# Patient Record
Sex: FEMALE | Race: WHITE | NOT HISPANIC OR LATINO | URBAN - METROPOLITAN AREA
[De-identification: names, ages, dates, MRNs, and addresses within clinical notes are randomized per-mention and may not be internally consistent; named-entity substitution may affect disease eponyms.]

---

## 2018-09-30 VITALS
TEMPERATURE: 100 F | HEART RATE: 75 BPM | OXYGEN SATURATION: 96 % | SYSTOLIC BLOOD PRESSURE: 115 MMHG | RESPIRATION RATE: 18 BRPM | HEIGHT: 61 IN | DIASTOLIC BLOOD PRESSURE: 75 MMHG | WEIGHT: 171.08 LBS

## 2018-09-30 LAB
ALBUMIN SERPL ELPH-MCNC: 4.3 G/DL — SIGNIFICANT CHANGE UP (ref 3.3–5)
ALP SERPL-CCNC: 77 U/L — SIGNIFICANT CHANGE UP (ref 40–120)
ALT FLD-CCNC: 30 U/L — SIGNIFICANT CHANGE UP (ref 10–45)
ANION GAP SERPL CALC-SCNC: 11 MMOL/L — SIGNIFICANT CHANGE UP (ref 5–17)
AST SERPL-CCNC: 26 U/L — SIGNIFICANT CHANGE UP (ref 10–40)
BASOPHILS NFR BLD AUTO: 0.8 % — SIGNIFICANT CHANGE UP (ref 0–2)
BILIRUB SERPL-MCNC: 0.5 MG/DL — SIGNIFICANT CHANGE UP (ref 0.2–1.2)
BUN SERPL-MCNC: 13 MG/DL — SIGNIFICANT CHANGE UP (ref 7–23)
CALCIUM SERPL-MCNC: 9.3 MG/DL — SIGNIFICANT CHANGE UP (ref 8.4–10.5)
CHLORIDE SERPL-SCNC: 98 MMOL/L — SIGNIFICANT CHANGE UP (ref 96–108)
CO2 SERPL-SCNC: 27 MMOL/L — SIGNIFICANT CHANGE UP (ref 22–31)
CREAT SERPL-MCNC: 0.63 MG/DL — SIGNIFICANT CHANGE UP (ref 0.5–1.3)
EOSINOPHIL NFR BLD AUTO: 11 % — HIGH (ref 0–6)
GLUCOSE SERPL-MCNC: 91 MG/DL — SIGNIFICANT CHANGE UP (ref 70–99)
HCT VFR BLD CALC: 43.6 % — SIGNIFICANT CHANGE UP (ref 34.5–45)
HGB BLD-MCNC: 14.2 G/DL — SIGNIFICANT CHANGE UP (ref 11.5–15.5)
LYMPHOCYTES # BLD AUTO: 27.4 % — SIGNIFICANT CHANGE UP (ref 13–44)
MCHC RBC-ENTMCNC: 32.6 G/DL — SIGNIFICANT CHANGE UP (ref 32–36)
MCHC RBC-ENTMCNC: 33.8 PG — SIGNIFICANT CHANGE UP (ref 27–34)
MCV RBC AUTO: 103.8 FL — HIGH (ref 80–100)
MONOCYTES NFR BLD AUTO: 9.2 % — SIGNIFICANT CHANGE UP (ref 2–14)
NEUTROPHILS NFR BLD AUTO: 51.6 % — SIGNIFICANT CHANGE UP (ref 43–77)
PLATELET # BLD AUTO: 229 K/UL — SIGNIFICANT CHANGE UP (ref 150–400)
POTASSIUM SERPL-MCNC: 4.4 MMOL/L — SIGNIFICANT CHANGE UP (ref 3.5–5.3)
POTASSIUM SERPL-SCNC: 4.4 MMOL/L — SIGNIFICANT CHANGE UP (ref 3.5–5.3)
PROT SERPL-MCNC: 7.2 G/DL — SIGNIFICANT CHANGE UP (ref 6–8.3)
RBC # BLD: 4.2 M/UL — SIGNIFICANT CHANGE UP (ref 3.8–5.2)
RBC # FLD: 13 % — SIGNIFICANT CHANGE UP (ref 10.3–16.9)
SODIUM SERPL-SCNC: 136 MMOL/L — SIGNIFICANT CHANGE UP (ref 135–145)
TROPONIN T SERPL-MCNC: <0.01 NG/ML — SIGNIFICANT CHANGE UP (ref 0–0.01)
WBC # BLD: 7.3 K/UL — SIGNIFICANT CHANGE UP (ref 3.8–10.5)
WBC # FLD AUTO: 7.3 K/UL — SIGNIFICANT CHANGE UP (ref 3.8–10.5)

## 2018-09-30 PROCEDURE — 70486 CT MAXILLOFACIAL W/O DYE: CPT | Mod: 26

## 2018-09-30 PROCEDURE — 71275 CT ANGIOGRAPHY CHEST: CPT | Mod: 26

## 2018-09-30 PROCEDURE — 99285 EMERGENCY DEPT VISIT HI MDM: CPT

## 2018-09-30 PROCEDURE — 71045 X-RAY EXAM CHEST 1 VIEW: CPT | Mod: 26

## 2018-09-30 PROCEDURE — 70450 CT HEAD/BRAIN W/O DYE: CPT | Mod: 26

## 2018-09-30 RX ORDER — SODIUM CHLORIDE 9 MG/ML
1000 INJECTION INTRAMUSCULAR; INTRAVENOUS; SUBCUTANEOUS ONCE
Qty: 0 | Refills: 0 | Status: COMPLETED | OUTPATIENT
Start: 2018-09-30 | End: 2018-09-30

## 2018-09-30 RX ORDER — TETANUS TOXOID, REDUCED DIPHTHERIA TOXOID AND ACELLULAR PERTUSSIS VACCINE, ADSORBED 5; 2.5; 8; 8; 2.5 [IU]/.5ML; [IU]/.5ML; UG/.5ML; UG/.5ML; UG/.5ML
0.5 SUSPENSION INTRAMUSCULAR ONCE
Qty: 0 | Refills: 0 | Status: COMPLETED | OUTPATIENT
Start: 2018-09-30 | End: 2018-09-30

## 2018-09-30 RX ORDER — IPRATROPIUM/ALBUTEROL SULFATE 18-103MCG
3 AEROSOL WITH ADAPTER (GRAM) INHALATION
Qty: 0 | Refills: 0 | Status: COMPLETED | OUTPATIENT
Start: 2018-09-30 | End: 2018-09-30

## 2018-09-30 RX ADMIN — Medication 3 MILLILITER(S): at 19:06

## 2018-09-30 RX ADMIN — SODIUM CHLORIDE 1000 MILLILITER(S): 9 INJECTION INTRAMUSCULAR; INTRAVENOUS; SUBCUTANEOUS at 21:18

## 2018-09-30 RX ADMIN — SODIUM CHLORIDE 2000 MILLILITER(S): 9 INJECTION INTRAMUSCULAR; INTRAVENOUS; SUBCUTANEOUS at 18:14

## 2018-09-30 RX ADMIN — Medication 125 MILLIGRAM(S): at 19:01

## 2018-09-30 RX ADMIN — TETANUS TOXOID, REDUCED DIPHTHERIA TOXOID AND ACELLULAR PERTUSSIS VACCINE, ADSORBED 0.5 MILLILITER(S): 5; 2.5; 8; 8; 2.5 SUSPENSION INTRAMUSCULAR at 19:20

## 2018-09-30 RX ADMIN — Medication 3 MILLILITER(S): at 19:02

## 2018-09-30 RX ADMIN — Medication 3 MILLILITER(S): at 19:03

## 2018-09-30 NOTE — ED PROVIDER NOTE - OBJECTIVE STATEMENT
69 y/o F former smoker for 20+ yrs w/hx HTN, HLD, hypothyroidism, anxiety on paroxetine and PRN xanax, COPD (recently started on meds 2mo ago), no known cardiac disease, p/w 4 syncopal episode/drop attacks in the past 3-4wks, no prior syncope, most recently today in which pt was walking down the street and collapsed, losing consciousness for several seconds, falling onto her face w/obvious facial trauma. Pt states she has facial pain 2/2 fall, though notes no prodromal sx. Of note has been coughing frequently recently, sometimes productive with white sputum, though states she is not SOB at baseline or at time of interview. No fever/chills. No abd pain or n/v. All other syncopal episodes have been similar in nature, occurring without warning. She syncopized at the dinner table last week, and 2 other times before that, once at [facility] awaiting a CT of her sinuses and chest (to f/u on known pulmonary nodules and frequent sinus infections). Did not go to the ED or get a workup at [facility] when she collapsed. At time of interview pt is only endorsing mild facial pain. No blurry vision, HA, neck pain, CP, SOB, palpitations, LE pain/swelling or other MSK pain.

## 2018-09-30 NOTE — ED ADULT TRIAGE NOTE - CHIEF COMPLAINT QUOTE
EMS states "she had an episode of syncope at a restaurant. She also passed out last week and went to [facility]."

## 2018-09-30 NOTE — ED ADULT NURSE NOTE - NSIMPLEMENTINTERV_GEN_ALL_ED
Implemented All Fall Risk Interventions:  [place] to call system. Call bell, personal items and telephone within reach. Instruct patient to call for assistance. Room bathroom lighting operational. Non-slip footwear when patient is off stretcher. Physically safe environment: no spills, clutter or unnecessary equipment. Stretcher in lowest position, wheels locked, appropriate side rails in place. Provide visual cue, wrist band, yellow gown, etc. Monitor gait and stability. Monitor for mental status changes and reorient to person, place, and time. Review medications for side effects contributing to fall risk. Reinforce activity limits and safety measures with patient and family.

## 2018-09-30 NOTE — ED ADULT NURSE NOTE - CHPI ED NUR SYMPTOMS NEG
no tingling/no nausea/no dizziness/no vomiting/no weakness/no chills/no decreased eating/drinking/no fever

## 2018-09-30 NOTE — ED ADULT NURSE NOTE - OBJECTIVE STATEMENT
68 yr old female patient with c/o of syncope episode.  PAtient states she has been seeing her PMD & ENT for syncopal episodes x2weeks (followed at [facility]).  Patient states the last she remembered she was walking around to [employer] to get a birthday card and then she woke up to EMS.  PAtient A+OX3, awake, no distress noted.  Abrasions noted to facial area, with blood shot R eye.  Denies dizziness at this time.  Denies being seen by neurologist.  Denies chest pain, sob/elmer, n/v/d/f.

## 2018-09-30 NOTE — ED ADULT NURSE NOTE - CHIEF COMPLAINT QUOTE
EMS states "she had an episode of syncope at a restaurant. She also passed out last week and went to [facility] [place]."

## 2018-09-30 NOTE — ED PROVIDER NOTE - PHYSICAL EXAMINATION
VITAL SIGNS: I have reviewed nursing notes and confirm.  CONSTITUTIONAL: Well-developed; well-nourished female w/obvious facial trauma though otherwise comfortable and conversational; in no acute distress.  SKIN: Agree with RN documentation regarding decubitus evaluation. Remainder of skin exam is warm and dry, no acute rash.  HEAD: Normocephalic; + scattered facial abrasions with 3x3cm hematoma L forehead, no fernanda abnormality appreciated  EYES: PERRL, EOM intact; + L eye subconjunctival hemorrhage w/mild hemorraghic chemosis, no hyphema, no proptosis  ENT: No nasal discharge; airway clear, no septal hematoma, no hemotympanum  NECK: Supple; non tender.  CARD: S1, S2 normal; no murmurs, gallops, or rubs. RRR  RESP: + b/l exp wheeze all lung fields w/good excursion, no inc wob/accessory muscle recruitment or tachypnea.  ABD: Normal bowel sounds; soft; non-distended; non-tender  EXT: Normal ROM. No clubbing, cyanosis or edema.  LYMPH: No acute cervical adenopathy.  NEURO: Alert, oriented. Grossly unremarkable.  PSYCH: Cooperative, appropriate.

## 2018-10-01 ENCOUNTER — INPATIENT (INPATIENT)
Facility: HOSPITAL | Age: 68
LOS: 2 days | Discharge: HOME | DRG: 312 | End: 2018-10-04
Attending: INTERNAL MEDICINE | Admitting: INTERNAL MEDICINE
Payer: MEDICARE

## 2018-10-01 DIAGNOSIS — E03.9 HYPOTHYROIDISM, UNSPECIFIED: ICD-10-CM

## 2018-10-01 DIAGNOSIS — J44.9 CHRONIC OBSTRUCTIVE PULMONARY DISEASE, UNSPECIFIED: ICD-10-CM

## 2018-10-01 DIAGNOSIS — Z96.659 PRESENCE OF UNSPECIFIED ARTIFICIAL KNEE JOINT: Chronic | ICD-10-CM

## 2018-10-01 DIAGNOSIS — I10 ESSENTIAL (PRIMARY) HYPERTENSION: ICD-10-CM

## 2018-10-01 DIAGNOSIS — R55 SYNCOPE AND COLLAPSE: ICD-10-CM

## 2018-10-01 DIAGNOSIS — E78.00 PURE HYPERCHOLESTEROLEMIA, UNSPECIFIED: ICD-10-CM

## 2018-10-01 LAB
ANION GAP SERPL CALC-SCNC: 16 MMOL/L — SIGNIFICANT CHANGE UP (ref 5–17)
BUN SERPL-MCNC: 13 MG/DL — SIGNIFICANT CHANGE UP (ref 7–23)
CALCIUM SERPL-MCNC: 9.6 MG/DL — SIGNIFICANT CHANGE UP (ref 8.4–10.5)
CHLORIDE SERPL-SCNC: 100 MMOL/L — SIGNIFICANT CHANGE UP (ref 96–108)
CHOLEST SERPL-MCNC: 191 MG/DL — SIGNIFICANT CHANGE UP (ref 10–199)
CO2 SERPL-SCNC: 23 MMOL/L — SIGNIFICANT CHANGE UP (ref 22–31)
CREAT SERPL-MCNC: 0.57 MG/DL — SIGNIFICANT CHANGE UP (ref 0.5–1.3)
GLUCOSE SERPL-MCNC: 170 MG/DL — HIGH (ref 70–99)
HBA1C BLD-MCNC: 5.8 % — HIGH (ref 4–5.6)
HCT VFR BLD CALC: 38.8 % — SIGNIFICANT CHANGE UP (ref 34.5–45)
HDLC SERPL-MCNC: 82 MG/DL — SIGNIFICANT CHANGE UP
HGB BLD-MCNC: 13.2 G/DL — SIGNIFICANT CHANGE UP (ref 11.5–15.5)
LIPID PNL WITH DIRECT LDL SERPL: 101 MG/DL — SIGNIFICANT CHANGE UP
MAGNESIUM SERPL-MCNC: 2.5 MG/DL — SIGNIFICANT CHANGE UP (ref 1.6–2.6)
MCHC RBC-ENTMCNC: 34 G/DL — SIGNIFICANT CHANGE UP (ref 32–36)
MCHC RBC-ENTMCNC: 34.6 PG — HIGH (ref 27–34)
MCV RBC AUTO: 101.8 FL — HIGH (ref 80–100)
PLATELET # BLD AUTO: 213 K/UL — SIGNIFICANT CHANGE UP (ref 150–400)
POTASSIUM SERPL-MCNC: 4.5 MMOL/L — SIGNIFICANT CHANGE UP (ref 3.5–5.3)
POTASSIUM SERPL-SCNC: 4.5 MMOL/L — SIGNIFICANT CHANGE UP (ref 3.5–5.3)
RAPID RVP RESULT: SIGNIFICANT CHANGE UP
RBC # BLD: 3.81 M/UL — SIGNIFICANT CHANGE UP (ref 3.8–5.2)
RBC # FLD: 13.1 % — SIGNIFICANT CHANGE UP (ref 10.3–16.9)
SODIUM SERPL-SCNC: 139 MMOL/L — SIGNIFICANT CHANGE UP (ref 135–145)
T3FREE SERPL-MCNC: 1.65 PG/ML — LOW (ref 1.71–3.71)
T4 FREE SERPL-MCNC: 1 NG/DL — SIGNIFICANT CHANGE UP (ref 0.7–1.48)
TOTAL CHOLESTEROL/HDL RATIO MEASUREMENT: 2.3 RATIO — LOW (ref 3.3–7.1)
TRIGL SERPL-MCNC: 41 MG/DL — SIGNIFICANT CHANGE UP (ref 10–149)
TROPONIN T SERPL-MCNC: <0.01 NG/ML — SIGNIFICANT CHANGE UP (ref 0–0.01)
TSH SERPL-MCNC: 0.57 UIU/ML — SIGNIFICANT CHANGE UP (ref 0.35–4.94)
WBC # BLD: 8.8 K/UL — SIGNIFICANT CHANGE UP (ref 3.8–10.5)
WBC # FLD AUTO: 8.8 K/UL — SIGNIFICANT CHANGE UP (ref 3.8–10.5)

## 2018-10-01 PROCEDURE — 93306 TTE W/DOPPLER COMPLETE: CPT | Mod: 26

## 2018-10-01 RX ORDER — IPRATROPIUM/ALBUTEROL SULFATE 18-103MCG
3 AEROSOL WITH ADAPTER (GRAM) INHALATION EVERY 6 HOURS
Qty: 0 | Refills: 0 | Status: DISCONTINUED | OUTPATIENT
Start: 2018-10-01 | End: 2018-10-04

## 2018-10-01 RX ORDER — ATORVASTATIN CALCIUM 80 MG/1
10 TABLET, FILM COATED ORAL AT BEDTIME
Qty: 0 | Refills: 0 | Status: DISCONTINUED | OUTPATIENT
Start: 2018-10-01 | End: 2018-10-04

## 2018-10-01 RX ORDER — ALBUTEROL 90 UG/1
2 AEROSOL, METERED ORAL
Qty: 0 | Refills: 0 | COMMUNITY

## 2018-10-01 RX ORDER — ALPRAZOLAM 0.25 MG
1 TABLET ORAL THREE TIMES A DAY
Qty: 0 | Refills: 0 | Status: DISCONTINUED | OUTPATIENT
Start: 2018-10-01 | End: 2018-10-04

## 2018-10-01 RX ORDER — LEVOTHYROXINE SODIUM 125 MCG
150 TABLET ORAL DAILY
Qty: 0 | Refills: 0 | Status: DISCONTINUED | OUTPATIENT
Start: 2018-10-01 | End: 2018-10-04

## 2018-10-01 RX ORDER — ATORVASTATIN CALCIUM 80 MG/1
1 TABLET, FILM COATED ORAL
Qty: 0 | Refills: 0 | COMMUNITY

## 2018-10-01 RX ORDER — AMLODIPINE BESYLATE 2.5 MG/1
10 TABLET ORAL DAILY
Qty: 0 | Refills: 0 | Status: DISCONTINUED | OUTPATIENT
Start: 2018-10-01 | End: 2018-10-04

## 2018-10-01 RX ORDER — HEPARIN SODIUM 5000 [USP'U]/ML
5000 INJECTION INTRAVENOUS; SUBCUTANEOUS EVERY 8 HOURS
Qty: 0 | Refills: 0 | Status: DISCONTINUED | OUTPATIENT
Start: 2018-10-01 | End: 2018-10-04

## 2018-10-01 RX ORDER — LANOLIN ALCOHOL/MO/W.PET/CERES
5 CREAM (GRAM) TOPICAL AT BEDTIME
Qty: 0 | Refills: 0 | Status: DISCONTINUED | OUTPATIENT
Start: 2018-10-01 | End: 2018-10-04

## 2018-10-01 RX ORDER — PANTOPRAZOLE SODIUM 20 MG/1
40 TABLET, DELAYED RELEASE ORAL
Qty: 0 | Refills: 0 | Status: DISCONTINUED | OUTPATIENT
Start: 2018-10-01 | End: 2018-10-04

## 2018-10-01 RX ORDER — INFLUENZA VIRUS VACCINE 15; 15; 15; 15 UG/.5ML; UG/.5ML; UG/.5ML; UG/.5ML
0.5 SUSPENSION INTRAMUSCULAR ONCE
Qty: 0 | Refills: 0 | Status: DISCONTINUED | OUTPATIENT
Start: 2018-10-01 | End: 2018-10-04

## 2018-10-01 RX ADMIN — Medication 30 MILLIGRAM(S): at 14:49

## 2018-10-01 RX ADMIN — Medication 1 MILLIGRAM(S): at 21:42

## 2018-10-01 RX ADMIN — Medication 3 MILLILITER(S): at 05:43

## 2018-10-01 RX ADMIN — HEPARIN SODIUM 5000 UNIT(S): 5000 INJECTION INTRAVENOUS; SUBCUTANEOUS at 21:42

## 2018-10-01 RX ADMIN — ATORVASTATIN CALCIUM 10 MILLIGRAM(S): 80 TABLET, FILM COATED ORAL at 21:42

## 2018-10-01 RX ADMIN — Medication 3 MILLILITER(S): at 18:52

## 2018-10-01 RX ADMIN — HEPARIN SODIUM 5000 UNIT(S): 5000 INJECTION INTRAVENOUS; SUBCUTANEOUS at 05:43

## 2018-10-01 RX ADMIN — Medication 5 MILLIGRAM(S): at 03:00

## 2018-10-01 RX ADMIN — Medication 150 MICROGRAM(S): at 14:48

## 2018-10-01 RX ADMIN — AMLODIPINE BESYLATE 10 MILLIGRAM(S): 2.5 TABLET ORAL at 14:48

## 2018-10-01 RX ADMIN — HEPARIN SODIUM 5000 UNIT(S): 5000 INJECTION INTRAVENOUS; SUBCUTANEOUS at 14:48

## 2018-10-01 RX ADMIN — Medication 3 MILLILITER(S): at 13:02

## 2018-10-01 NOTE — H&P ADULT - PROBLEM SELECTOR PLAN 2
Patient in exacerbation on admission and given solumedrol 125mg IV x 1 and nebs in ED  - discuss continuation of steroids with Dr. Wiggins  - consider pulm consult  - CTA ruled out PE and consolidation

## 2018-10-01 NOTE — PROGRESS NOTE ADULT - SUBJECTIVE AND OBJECTIVE BOX
Interventional Cardiology PA Adult Progress Note    CC: syncope  Subjective Assessment: Pt seen and examined at bedside. Pt currently hungry   	  MEDICATIONS:  amLODIPine   Tablet 10 milliGRAM(s) Oral daily      ALBUTerol/ipratropium for Nebulization 3 milliLiter(s) Nebulizer every 6 hours    ALPRAZolam 1 milliGRAM(s) Oral three times a day  melatonin 5 milliGRAM(s) Oral at bedtime  PARoxetine 30 milliGRAM(s) Oral daily    pantoprazole    Tablet 40 milliGRAM(s) Oral before breakfast    atorvastatin 10 milliGRAM(s) Oral at bedtime  levothyroxine 150 MICROGram(s) Oral daily    heparin  Injectable 5000 Unit(s) SubCutaneous every 8 hours  influenza   Vaccine 0.5 milliLiter(s) IntraMuscular once      	    [PHYSICAL EXAM:  TELEMETRY:  T(C): 36.8 (10-01-18 @ 14:45), Max: 37.7 (10-01-18 @ 06:22)  HR: 87 (10-01-18 @ 14:55) (77 - 87)  BP: 134/64 (10-01-18 @ 14:55) (112/65 - 134/64)  RR: 16 (10-01-18 @ 14:55) (16 - 18)  SpO2: 96% (10-01-18 @ 14:55) (92% - 96%)  Wt(kg): --  I&O's Summary    01 Oct 2018 07:01  -  01 Oct 2018 17:24  --------------------------------------------------------  IN: 0 mL / OUT: 0 mL / NET: 0 mL      Height (cm): 154.94 (10-01 @ 06:36)  Weight (kg): 79.2 (10-01 @ 06:36)  BMI (kg/m2): 33 (10-01 @ 06:36)  BSA (m2): 1.78 (10-01 @ 06:36)  Clemente:  Central/PICC/Mid Line:                                         Appearance: Normal	  HEENT:   Normal oral mucosa, PERRL, EOMI	  Neck: Supple, + JVD/ - JVD; Carotid Bruit   Cardiovascular: Normal S1 S2, No JVD, No murmurs,   Respiratory: Lungs clear to auscultation/Decreased Breath Sounds/No Rales, Rhonchi, Wheezing	  Gastrointestinal:  Soft, Non-tender, + BS	  Skin: No rashes, No ecchymoses, No cyanosis  Extremities: Normal range of motion, No clubbing, cyanosis or edema  Vascular: Peripheral pulses palpable 2+ bilaterally  Neurologic: Non-focal  Psychiatry: A & O x 3, Mood & affect appropriate      	    ECG:  	  RADIOLOGY:   DIAGNOSTIC TESTING:  [ ] Echocardiogram:  [ ]  Catheterization:  [ ] Stress Test:    [ ] CARMEN  OTHER: 	    LABS:	 	  CARDIAC MARKERS:                                  13.2   8.8   )-----------( 213      ( 01 Oct 2018 07:05 )             38.8     10-01    139  |  100  |  13  ----------------------------<  170<H>  4.5   |  23  |  0.57    Ca    9.6      01 Oct 2018 07:05  Mg     2.5     10-01    TPro  7.2  /  Alb  4.3  /  TBili  0.5  /  DBili  x   /  AST  26  /  ALT  30  /  AlkPhos  77  09-30    proBNP:   Lipid Profile:   HgA1c: Hemoglobin A1C, Whole Blood: 5.8 % (10-01 @ 07:05)    TSH: Thyroid Stimulating Hormone, Serum: 0.572 uIU/mL (10-01 @ 07:05)        ASSESSMENT/PLAN: 	        DVT ppx:  Dispo: Interventional Cardiology PA Adult Progress Note    CC: syncope  Subjective Assessment: Pt seen and examined at bedside. Pt currently hungry. Denies dizziness or chest pain.    12 point review of systems otherwise negative except for subjective HPI.   	  MEDICATIONS:  amLODIPine   Tablet 10 milliGRAM(s) Oral daily      ALBUTerol/ipratropium for Nebulization 3 milliLiter(s) Nebulizer every 6 hours    ALPRAZolam 1 milliGRAM(s) Oral three times a day  melatonin 5 milliGRAM(s) Oral at bedtime  PARoxetine 30 milliGRAM(s) Oral daily    pantoprazole    Tablet 40 milliGRAM(s) Oral before breakfast    atorvastatin 10 milliGRAM(s) Oral at bedtime  levothyroxine 150 MICROGram(s) Oral daily    heparin  Injectable 5000 Unit(s) SubCutaneous every 8 hours  influenza   Vaccine 0.5 milliLiter(s) IntraMuscular once      	    [PHYSICAL EXAM:  TELEMETRY:  T(C): 36.8 (10-01-18 @ 14:45), Max: 37.7 (10-01-18 @ 06:22)  HR: 87 (10-01-18 @ 14:55) (77 - 87)  BP: 134/64 (10-01-18 @ 14:55) (112/65 - 134/64)  RR: 16 (10-01-18 @ 14:55) (16 - 18)  SpO2: 96% (10-01-18 @ 14:55) (92% - 96%)  Wt(kg): --  I&O's Summary    01 Oct 2018 07:01  -  01 Oct 2018 17:24  --------------------------------------------------------  IN: 0 mL / OUT: 0 mL / NET: 0 mL      Height (cm): 154.94 (10-01 @ 06:36)  Weight (kg): 79.2 (10-01 @ 06:36)  BMI (kg/m2): 33 (10-01 @ 06:36)  BSA (m2): 1.78 (10-01 @ 06:36)  Clemente:  Central/PICC/Mid Line:                                         Appearance: bruising and lacerations on face, well groomed, well appearing 	  HEENT:   Normal oral mucosa, PERRL, EOMI, subconjunctival hemorrhage to R eye 	  Neck: Supple,  - JVD;   Cardiovascular: Normal S1 S2, No JVD, No murmurs,   Respiratory: No Rales, Rhonchi, B/L faint exp/insp Wheezing	  Gastrointestinal:  Soft, Non-tender, + BS	  Skin: No rashes, No ecchymoses, No cyanosis  Extremities: Normal range of motion, No clubbing, cyanosis or edema  Vascular: Peripheral pulses palpable 2+ bilaterally  Neurologic: Non-focal  Psychiatry: A & O x 3, Mood & affect appropriate      	    ECG:  	  RADIOLOGY:   DIAGNOSTIC TESTING:  [ ] Echocardiogram:  [ ]  Catheterization:  [ ] Stress Test:    [ ] CARMEN  OTHER: 	    LABS:	 	  CARDIAC MARKERS:                                  13.2   8.8   )-----------( 213      ( 01 Oct 2018 07:05 )             38.8     10-01    139  |  100  |  13  ----------------------------<  170<H>  4.5   |  23  |  0.57    Ca    9.6      01 Oct 2018 07:05  Mg     2.5     10-01    TPro  7.2  /  Alb  4.3  /  TBili  0.5  /  DBili  x   /  AST  26  /  ALT  30  /  AlkPhos  77  09-30    proBNP:   Lipid Profile:   HgA1c: Hemoglobin A1C, Whole Blood: 5.8 % (10-01 @ 07:05)    TSH: Thyroid Stimulating Hormone, Serum: 0.572 uIU/mL (10-01 @ 07:05)        ASSESSMENT/PLAN: 	        DVT ppx:  Dispo:

## 2018-10-01 NOTE — H&P ADULT - PROBLEM SELECTOR PLAN 4
Patient with hx of elevated cholesterol on statin at home  - follow up lipid panel in AM  - determine dose of atorva with pharmacy in AM and continue

## 2018-10-01 NOTE — H&P ADULT - PROBLEM SELECTOR PLAN 5
SBP 120s  - determine dose of home amlodipine in AM and continue    DVT PPX: Heparin SQ  DISPO: Pending further workup of syncope and monitoring on tele

## 2018-10-01 NOTE — PROGRESS NOTE ADULT - PROBLEM SELECTOR PLAN 2
Patient with wheezing on admission and given solumedrol 125mg IV x 1 and nebs in ED  - pulm consulted, Dr. Becker  - CTA ruled out PE and consolidation  -continue with duonebs PRN, RVP negative  -pending sputum sample  -continue with NC, humidified  -no abx or steroids at this time per Dr. Becker

## 2018-10-01 NOTE — H&P ADULT - PROBLEM SELECTOR PLAN 3
- follow up TSH, T3 and T4 with AM labs  - determine dose of home levothyroxine from pharmacy and continue in AM

## 2018-10-01 NOTE — PROGRESS NOTE ADULT - PROBLEM SELECTOR PLAN 1
continue to monitor on tele  - trops neg x 3  - ECHO 10/1: normal LV function, EF 60-65, Trace TR  - NPO for NST tomorrow AM, discuss with Dr. Wiggins in AM  -US carotids B/L ordered  -EEG ordered

## 2018-10-01 NOTE — H&P ADULT - PROBLEM SELECTOR PLAN 1
- monitor on tele  - trops neg x 2, follow up repeat in AM  - follow up echocardiogram  - follow up orthostatics  - follow up TSH, T3 and T4 with AM labs - monitor on tele  - trops neg x 2, follow up repeat in AM  - follow up echocardiogram  - follow up orthostatics  - NPO for possible NST, discuss with Dr. Wiggins in AM

## 2018-10-01 NOTE — H&P ADULT - NSHPSOCIALHISTORY_GEN_ALL_CORE
14.2   7.3   )-----------( 229      ( 30 Sep 2018 17:00 )             43.6       09-30    136  |  98  |  13  ----------------------------<  91  4.4   |  27  |  0.63    Ca    9.3      30 Sep 2018 17:00    TPro  7.2  /  Alb  4.3  /  TBili  0.5  /  DBili  x   /  AST  26  /  ALT  30  /  AlkPhos  77  09-30          CARDIAC MARKERS ( 30 Sep 2018 22:51 )  x     / <0.01 ng/mL / x     / x     / x      CARDIAC MARKERS ( 30 Sep 2018 17:00 )  x     / <0.01 ng/mL / 100 U/L / x     / 1.8 ng/mL

## 2018-10-01 NOTE — PROGRESS NOTE ADULT - SUBJECTIVE AND OBJECTIVE BOX
Interventional, Pulmonary, Critical, Chest Special Procedures.    Pt was seen and fully examined by myself.     Time spent with patient in minutes:77    Patient is a 68y old  Female who presents with a chief complaint of syncope (01 Oct 2018 01:57)Events leading to this admission reviewed. The patient reports intermittent and spasmodic severe cough which possibly triggers syncopal episodes. The cough is intermittent productive to clear sputum. The patient denies aspiration, denies hemoptysis, denies fever. She has had a cat for 15yrs. The patient saw multiple specialists including Pulmonary, ENT, Alergist at [facility]. The patient reports no improvementt and if anything worsening of her symptoms despite extensive testing. The patient is now anxious, dry cough is present. She is eupneic on 2LNC when seen and pain free.    HPI:  67yo F, former smoker (quit 1mo ago), with FHx MI (brother MI 62yo, father 43yo) and PMHx of HTN, HLD, hypothyroidism, anxiety and COPD who presented to [facility] ED on 10/1/18 complaining of cough and syncope x 4. Patient states first episode was when she was getting an outpatient CT scan at [facility] last Wed 9/26. She was walking along and suddenly woke up on the floor. She declined to be brought to the ED there at that time. Her next episode occurred while sitting on the couch when she suddenly awoke on the floor. Patient states she does not think she was out for more than a few seconds because the same comercial was still on TV. Yesterday she syncopized at the dinner table and her face went into her meal. Most recently today, she went for a walk and lost consciousness and awoke on the street. All incidents were preceded by lightheadedness and included head trauma. Patient has also been complaining of productive cough with white sputum x 3 weeks for which she has been followed by her PCP, pulm and ENT. She had CT scan of her sinuses that only revealed deviated septum and CT chest which revealed improvement in prior ?nodules. Patient states cough has been somewhat improving. Patient denies CP, palpitations, PND, orthopnea, N/V, hematochezia, melena, LE edema, tongue biting, evacuation of bladder/bowels, involuntary movement or seizure activity. She was scheduled for an outpatient stress test today and states she thinks she recently had an echocardiogram. Vitals on arrival to the ED were Temp 99.6F, HR 75bpm, /75, RR 18, O2 sat 96% RA. Labs significant for trops neg x 2. EKG revealed SR @ 70bpm with TWI in AVL, V2 and V3. CXR revealed small bandlike, patchy opacity across right lung base, likely representing atelectatic change. CT head revealed soft tissue injury of the left frontal scalp, no acute fracture, no intracranial hemorrhage. CTA ruled out PE and consolidation. In the ED, patient was given nebs, dTAP vaccine, solumedrol 125mg IV x 1, 1L NS. Patient is now admitted to [facility] for further workup of syncope. (01 Oct 2018 01:57)    REVIEW OF SYSTEMS:  Constitutional: No fever, weight loss, chills +++ fatigue  Eyes: No eye pain, visual disturbances, or discharge  ENMT:  No difficulty hearing, tinnitus, vertigo; No sinus or throat pain. No epistaxis, dysphagia,+ dysphonia, hoarseness no odynophagia  Neck: No pain, stiffness or neck swelling.  No masses or deformities  Respiratory: +++ cough, no wheezing, chills or hemoptysis  + COPD  - ILD   - PE   - ASTHMA     - PNEUMONIA  Cardiovascular: No chest pain, dysrhythmia, palpitations, dizziness or edema   +COPD     - CAD   - CHF   - HTN  Gastrointestinal: No abdominal or epigastric pain. No nausea, vomiting or hematemesis; No diarrhea or constipation. No melena or hematochezia. No dysphagia or Icterus.          Genitourinary: No dysuria, frequency, hematuria or incontinence   - CKD/CHAITANYA      - ESRD  Neurological: No headaches, memory loss, loss of strength, numbness or tremors      -DEMENTIA     - STROKE    - SEIZURE  Skin: No itching, burning, rashes or lesions   Lymph Nodes: No enlarged glands  Endocrine: No heat or cold intolerance; No hair loss       - DM     + THYROID DISORDER  Musculoskeletal: No joint pain or swelling; No muscle, back or extremity pain  Psychiatric: No depression, anxiety, mood swings or difficulty sleeping  Heme/Lymph: No easy bruising or bleeding gums         - ANEMIA      - CANCER   -COAGULOPATHY  Allergy and Immunologic: No hives or eczema    PAST MEDICAL & SURGICAL HISTORY:  Depression  Hypothyroidism  Anxiety  Goiter  High cholesterol  HTN (hypertension)  S/P total knee replacement    FAMILY HISTORY:  Family history of heart disease (Father)    SOCIAL HISTORY:      - Tobacco     - ETOH    Allergies    losartan (Angioedema)    Intolerances      Vital Signs Last 24 Hrs  T(C): 37.1 (01 Oct 2018 10:46), Max: 37.7 (01 Oct 2018 06:22)  T(F): 98.7 (01 Oct 2018 10:46), Max: 99.8 (01 Oct 2018 06:22)  HR: 80 (01 Oct 2018 08:42) (75 - 83)  BP: 124/60 (01 Oct 2018 08:42) (112/65 - 132/66)  BP(mean): 84 (01 Oct 2018 08:30) (83 - 84)  RR: 16 (01 Oct 2018 08:42) (16 - 18)  SpO2: 92% (01 Oct 2018 08:42) (92% - 96%)      PHYSICAL EXAM:  Un Comfortable, no immediate distress  Eyes: PERRL, EOM intact; conjunctiva injected, and sclera clear  Head: Normocephalic; + frontal ecchymoses Trauma  ENMT: No nasal discharge, +hoarseness, cough   Neck: Supple; non tender; no masses or deformities.    No JVD  Respiratory: - WHEEZING   + few scattered  RHONCHI  - RALES  - CRACKLES.  Diminished breath sounds  BILATERAL  RIGHT  LEFT  Cardiovascular: Regular rate and rhythm. S1 and S2 Normal; No murmurs, gallops or rubs     - PPM/AICD  Gastrointestinal: Soft non-tender, non-distended; Normal bowel sounds; No hepatosplenomegaly.     -PEG    -  GT   - STACY  Genitourinary: No costovertebral angle tenderness. No dysuria  Extremities: AROM, No clubbing, cyanosis or edema    Vascular: Peripheral pulses palpable 2+ bilaterally  Neurological: Alert and responisve to stimuli   Skin: Warm and dry. No obvious rash  Lymph Nodes: No acute cervical or supraclavicular adenopathy  Psychiatric: Cooperative and anxious  DEVICES:  - DENTURES   +IV R / L     - ETUBE   -TRACH   -CTUBE  R / L      LABS:                          13.2   8.8   )-----------( 213      ( 01 Oct 2018 07:05 )             38.8     10-01    139  |  100  |  13  ----------------------------<  170<H>  4.5   |  23  |  0.57    Ca    9.6      01 Oct 2018 07:05  Mg     2.5     10-01    TPro  7.2  /  Alb  4.3  /  TBili  0.5  /  DBili  x   /  AST  26  /  ALT  30  /  AlkPhos  77  09-30      < from: CT Angio Chest PE Protocol w/ IV Cont (09.30.18 @ 22:48) >    EXAM:  CT ANGIO CHEST PE PROTOCOL IC                          PROCEDURE DATE:  09/30/2018          INTERPRETATION:  CTA (CT angiography) of the CHEST dated 9/30/2018 10:48   PM    INDICATION: Syncope/collapse. Assess for pulmonary embolism.    TECHNIQUE: CT angiography of the chest was performed during bolus   injection of intravenous contrast.  Post-processing including the   production of axial, coronal and sagittal multiplanar reformatted images   and axial and coronal maximum intensity projections (MIPs) was performed.   93 mL of Optiray 350 administered with 5 mL discarded.    PRIOR STUDY: None.    FINDINGS: No pulmonary embolism is seen. There is mild calcified plaque   in the aorta. No aortic dissection flap is seen. Moderate coronary artery   calcification is noted. The heart is normal in size. No pericardial   effusion is seen. No hilar or axillary lymphadenopathy is seen. There is   a nonspecific, mildly increased number of mediastinal lymph nodes without   abnormal enlargement of the nodes.    No pleural effusions are seen. Mild atelectatic changes are noted in the   bibasilar lungs. There is a 4 mm perifissural nodular opacity in the left   major fissure, image 136 series 6, most likely an intrapulmonary node.   Areas ofmucoid impaction in both lower lobes suggesting small airways   disease. Micronodule in the right upper lobe.    Limited evaluation of the upper abdomen demonstrates colonic   diverticulosis.     Evaluation of the osseous structures demonstrates moderate degenerative   changes of the spine. A hemangioma is noted in the vertebral body T10.      IMPRESSION:     1. No pulmonary embolism is seen. No consolidation.    < end of copied text >  RADIOLOGY & ADDITIONAL STUDIES (The following images were personally reviewed):

## 2018-10-01 NOTE — H&P ADULT - RS GEN PE MLT RESP DETAILS PC
no rhonchi/respirations non-labored/no rales/no intercostal retractions/diminished breath sounds, R/diminished breath sounds, L

## 2018-10-01 NOTE — H&P ADULT - ASSESSMENT
69yo F with FHx MI (brother MI 64yo, father 43yo) and PMHx of HTN, HLD, hypothyroidism, anxiety and COPD who presented to [facility] ED on 10/1/18 complaining of cough and syncope x 4. 69yo F, former smoker (quit 1mo ago), with FHx MI (brother MI 64yo, father 41yo) and PMHx of HTN, HLD, hypothyroidism, anxiety and COPD who presented to [facility] ED on 10/1/18 complaining of cough and syncope x 4.

## 2018-10-01 NOTE — H&P ADULT - HISTORY OF PRESENT ILLNESS
67yo F with FHx CAD and PMHx of HTN, HLD, hypothyroidism, anxiety and COPD who presented to [facility] ED on 10/1/18 complaining of syncopal episodes. Patient states that she was walking along the street today when she suddenly lost consciousness without prodrome. Patient states this has happened 4 times in the last 3 weeks. Yesterday, she was eating dinner and suddenly lost consciousness, awaking with her face in her plate of food. Vitals on arrival to the ED were Temp 99.6F, HR 75bpm, /75, RR 18, O2 sat 96% RA. Labs significant for trops neg x 2. EKG revealed SR @ 70bpm with TWI in AVL, V2 and V3. CXR revealed small bandlike, patchy opacity across right lung base, likely representing atelectatic change. CT head revealed soft tissue injury of the left frontal scalp, no acute fracture, no intracranial hemorrhage. CTA ruled out PE and consolidation. In the ED, patient was given nebs, dTAP vaccine, solumedrol 125mg IV x 1, 1L NS. Patient is now admitted to [facility] for further workup of syncope. 69yo F with FHx MI (brother MI 64yo, father 43yo) and PMHx of HTN, HLD, hypothyroidism, anxiety and COPD who presented to [facility] ED on 10/1/18 complaining of cough and syncope x 4. Patient states first episode was when she was getting an outpatient CT scan at [facility] last Wed 9/26. She was walking along and suddenly woke up on the floor. She declined to be brought to the ED there at that time. Her next episode occurred while sitting on the couch when she suddenly awoke on the floor. Patient states she does not think she was out for more than a few seconds because the same comercial was still on TV. Yesterday she syncopized at the dinner table and her face went into her meal. Most recently today, she went for a walk and lost consciousness and awoke on the street. All incidents were preceded by lightheadedness and included head trauma. Patient has also been complaining of productive cough with white sputum x 3 weeks for which she has been followed by her PCP, pulm and ENT. She had CT scan of her sinuses that only revealed deviated septum and CT chest which revealed improvement in prior ?nodules. Patient states cough has been somewhat improving. Patient denies CP, palpitations, PND, orthopnea, N/V, hematochezia, melena, LE edema, tongue biting, evacuation of bladder/bowels, involuntary movement or seizure activity. She was scheduled for an outpatient stress test today and states she thinks she recently had an echocardiogram. Vitals on arrival to the ED were Temp 99.6F, HR 75bpm, /75, RR 18, O2 sat 96% RA. Labs significant for trops neg x 2. EKG revealed SR @ 70bpm with TWI in AVL, V2 and V3. CXR revealed small bandlike, patchy opacity across right lung base, likely representing atelectatic change. CT head revealed soft tissue injury of the left frontal scalp, no acute fracture, no intracranial hemorrhage. CTA ruled out PE and consolidation. In the ED, patient was given nebs, dTAP vaccine, solumedrol 125mg IV x 1, 1L NS. Patient is now admitted to [facility] for further workup of syncope. 69yo F, former smoker (quit 1mo ago), with FHx MI (brother MI 64yo, father 41yo) and PMHx of HTN, HLD, hypothyroidism, anxiety and COPD who presented to [facility] ED on 10/1/18 complaining of cough and syncope x 4. Patient states first episode was when she was getting an outpatient CT scan at [facility] last Wed 9/26. She was walking along and suddenly woke up on the floor. She declined to be brought to the ED there at that time. Her next episode occurred while sitting on the couch when she suddenly awoke on the floor. Patient states she does not think she was out for more than a few seconds because the same comercial was still on TV. Yesterday she syncopized at the dinner table and her face went into her meal. Most recently today, she went for a walk and lost consciousness and awoke on the street. All incidents were preceded by lightheadedness and included head trauma. Patient has also been complaining of productive cough with white sputum x 3 weeks for which she has been followed by her PCP, pulm and ENT. She had CT scan of her sinuses that only revealed deviated septum and CT chest which revealed improvement in prior ?nodules. Patient states cough has been somewhat improving. Patient denies CP, palpitations, PND, orthopnea, N/V, hematochezia, melena, LE edema, tongue biting, evacuation of bladder/bowels, involuntary movement or seizure activity. She was scheduled for an outpatient stress test today and states she thinks she recently had an echocardiogram. Vitals on arrival to the ED were Temp 99.6F, HR 75bpm, /75, RR 18, O2 sat 96% RA. Labs significant for trops neg x 2. EKG revealed SR @ 70bpm with TWI in AVL, V2 and V3. CXR revealed small bandlike, patchy opacity across right lung base, likely representing atelectatic change. CT head revealed soft tissue injury of the left frontal scalp, no acute fracture, no intracranial hemorrhage. CTA ruled out PE and consolidation. In the ED, patient was given nebs, dTAP vaccine, solumedrol 125mg IV x 1, 1L NS. Patient is now admitted to [facility] for further workup of syncope.

## 2018-10-01 NOTE — PROGRESS NOTE ADULT - SUBJECTIVE AND OBJECTIVE BOX
Pt   is s/p COPD   ETOH   use up until  one month ago   Since that time Pt has had shaking and twitches and episodes of syncope  on four occasions    PAST MEDICAL & SURGICAL HISTORY:  Depression  Hypothyroidism  Anxiety  Goiter  High cholesterol  HTN (hypertension)  S/P total knee replacement    MEDICATIONS  (STANDING):  ALBUTerol/ipratropium for Nebulization 3 milliLiter(s) Nebulizer every 6 hours  ALPRAZolam 1 milliGRAM(s) Oral three times a day  amLODIPine   Tablet 10 milliGRAM(s) Oral daily  atorvastatin 10 milliGRAM(s) Oral at bedtime  heparin  Injectable 5000 Unit(s) SubCutaneous every 8 hours  influenza   Vaccine 0.5 milliLiter(s) IntraMuscular once  levothyroxine 150 MICROGram(s) Oral daily  melatonin 5 milliGRAM(s) Oral at bedtime  pantoprazole    Tablet 40 milliGRAM(s) Oral before breakfast  PARoxetine 30 milliGRAM(s) Oral daily    MEDICATIONS  (PRN):      ICU Vital Signs Last 24 Hrs  T(C): 36.8 (01 Oct 2018 14:45), Max: 37.7 (01 Oct 2018 06:22)  T(F): 98.2 (01 Oct 2018 14:45), Max: 99.8 (01 Oct 2018 06:22)  HR: 87 (01 Oct 2018 14:55) (77 - 87)  BP: 134/64 (01 Oct 2018 14:55) (112/65 - 134/64)  BP(mean): 84 (01 Oct 2018 08:30) (83 - 84)  ABP: --  ABP(mean): --  RR: 16 (01 Oct 2018 14:55) (16 - 18)  SpO2: 96% (01 Oct 2018 14:55) (92% - 96%)    Heent   Hematoma  L eye   lungs wheezes bilaterally and decreased breath sounds at base  cv  s1 s2   abd soft    ext  stable                          13.2   8.8   )-----------( 213      ( 01 Oct 2018 07:05 )             38.8   10-01    139  |  100  |  13  ----------------------------<  170<H>  4.5   |  23  |  0.57    Ca    9.6      01 Oct 2018 07:05  Mg     2.5     10-01    TPro  7.2  /  Alb  4.3  /  TBili  0.5  /  DBili  x   /  AST  26  /  ALT  30  /  AlkPhos  77  09-30  CARDIAC MARKERS ( 01 Oct 2018 07:05 )  x     / <0.01 ng/mL / x     / x     / x      CARDIAC MARKERS ( 30 Sep 2018 22:51 )  x     / <0.01 ng/mL / x     / x     / x      CARDIAC MARKERS ( 30 Sep 2018 17:00 )  x     / <0.01 ng/mL / 100 U/L / x     / 1.8 ng/mL    CTA  No PE   CT Head neg    for acute process  CT maxilo facial  neg for fracture       Ext stable

## 2018-10-02 LAB
ANION GAP SERPL CALC-SCNC: 9 MMOL/L — SIGNIFICANT CHANGE UP (ref 5–17)
BUN SERPL-MCNC: 12 MG/DL — SIGNIFICANT CHANGE UP (ref 7–23)
CALCIUM SERPL-MCNC: 9.2 MG/DL — SIGNIFICANT CHANGE UP (ref 8.4–10.5)
CHLORIDE SERPL-SCNC: 98 MMOL/L — SIGNIFICANT CHANGE UP (ref 96–108)
CO2 SERPL-SCNC: 29 MMOL/L — SIGNIFICANT CHANGE UP (ref 22–31)
CREAT SERPL-MCNC: 0.57 MG/DL — SIGNIFICANT CHANGE UP (ref 0.5–1.3)
GLUCOSE SERPL-MCNC: 119 MG/DL — HIGH (ref 70–99)
HCT VFR BLD CALC: 41.4 % — SIGNIFICANT CHANGE UP (ref 34.5–45)
HGB BLD-MCNC: 13.2 G/DL — SIGNIFICANT CHANGE UP (ref 11.5–15.5)
MAGNESIUM SERPL-MCNC: 2.4 MG/DL — SIGNIFICANT CHANGE UP (ref 1.6–2.6)
MCHC RBC-ENTMCNC: 31.9 G/DL — LOW (ref 32–36)
MCHC RBC-ENTMCNC: 33.8 PG — SIGNIFICANT CHANGE UP (ref 27–34)
MCV RBC AUTO: 106.2 FL — HIGH (ref 80–100)
PLATELET # BLD AUTO: 218 K/UL — SIGNIFICANT CHANGE UP (ref 150–400)
POTASSIUM SERPL-MCNC: 3.9 MMOL/L — SIGNIFICANT CHANGE UP (ref 3.5–5.3)
POTASSIUM SERPL-SCNC: 3.9 MMOL/L — SIGNIFICANT CHANGE UP (ref 3.5–5.3)
RBC # BLD: 3.9 M/UL — SIGNIFICANT CHANGE UP (ref 3.8–5.2)
RBC # FLD: 13.5 % — SIGNIFICANT CHANGE UP (ref 10.3–16.9)
SODIUM SERPL-SCNC: 136 MMOL/L — SIGNIFICANT CHANGE UP (ref 135–145)
WBC # BLD: 10.1 K/UL — SIGNIFICANT CHANGE UP (ref 3.8–10.5)
WBC # FLD AUTO: 10.1 K/UL — SIGNIFICANT CHANGE UP (ref 3.8–10.5)

## 2018-10-02 PROCEDURE — 93018 CV STRESS TEST I&R ONLY: CPT

## 2018-10-02 PROCEDURE — 93016 CV STRESS TEST SUPVJ ONLY: CPT

## 2018-10-02 PROCEDURE — 93880 EXTRACRANIAL BILAT STUDY: CPT | Mod: 26

## 2018-10-02 PROCEDURE — 78452 HT MUSCLE IMAGE SPECT MULT: CPT | Mod: 26

## 2018-10-02 PROCEDURE — 95951: CPT | Mod: 26

## 2018-10-02 RX ORDER — BENZOCAINE AND MENTHOL 5; 1 G/100ML; G/100ML
1 LIQUID ORAL ONCE
Qty: 0 | Refills: 0 | Status: COMPLETED | OUTPATIENT
Start: 2018-10-02 | End: 2018-10-02

## 2018-10-02 RX ADMIN — Medication 150 MICROGRAM(S): at 05:39

## 2018-10-02 RX ADMIN — HEPARIN SODIUM 5000 UNIT(S): 5000 INJECTION INTRAVENOUS; SUBCUTANEOUS at 05:39

## 2018-10-02 RX ADMIN — Medication 1 MILLIGRAM(S): at 05:43

## 2018-10-02 RX ADMIN — HEPARIN SODIUM 5000 UNIT(S): 5000 INJECTION INTRAVENOUS; SUBCUTANEOUS at 21:25

## 2018-10-02 RX ADMIN — Medication 5 MILLIGRAM(S): at 00:43

## 2018-10-02 RX ADMIN — BENZOCAINE AND MENTHOL 1 LOZENGE: 5; 1 LIQUID ORAL at 22:00

## 2018-10-02 RX ADMIN — Medication 3 MILLILITER(S): at 06:58

## 2018-10-02 RX ADMIN — Medication 5 MILLIGRAM(S): at 21:25

## 2018-10-02 RX ADMIN — ATORVASTATIN CALCIUM 10 MILLIGRAM(S): 80 TABLET, FILM COATED ORAL at 21:25

## 2018-10-02 RX ADMIN — Medication 3 MILLILITER(S): at 18:50

## 2018-10-02 RX ADMIN — Medication 1 MILLIGRAM(S): at 21:25

## 2018-10-02 RX ADMIN — Medication 30 MILLIGRAM(S): at 11:42

## 2018-10-02 RX ADMIN — AMLODIPINE BESYLATE 10 MILLIGRAM(S): 2.5 TABLET ORAL at 05:39

## 2018-10-02 RX ADMIN — PANTOPRAZOLE SODIUM 40 MILLIGRAM(S): 20 TABLET, DELAYED RELEASE ORAL at 06:59

## 2018-10-02 NOTE — CONSULT NOTE ADULT - ATTENDING COMMENTS
67 yo f with hx HTN, HLD, hypothyroidism and anxiety admitted with cough and 4 episodes of syncope, occurring in the setting of increasing stress levels.  Denies preceeding pre-syncopal symptoms however reports 2/4 episodes being proceeded by severe, prolonged coughing spells.  Reports very short LOC, with immediate return to baseline.  Have occurred both while sitting and standing. also recently being under inc stress and has had multiple panic attacks.  No prior episodes of syncope in the past.  No hx of seizures    Exam is nonfocal.    Undergoing cardiac workup for syncope    Etiology of LOC is c/w syncope, unlikely seizures.  Possibly vasovagal given recent stress levels and coughing spells.  Agree with ruling out cardiac causes.    Routine EEG showing only excess beta activity, seen in the setting of benzos.    Recs:  Recommend long term EEG monitoring at home as an outpt- can call [phone] to set up  Orthostatic vital signs reportedly done but not listed in flowsheet- would perform them lying, sitting, standing after holding each position 2 min (please record BP and HR)- if positive for orthostasis please give us a call.

## 2018-10-02 NOTE — PROGRESS NOTE ADULT - PROBLEM SELECTOR PLAN 2
Patient with wheezing on admission and given solumedrol 125mg IV x 1 and nebs in ED  - pulm consulted, Dr. Becker  - CTA ruled out PE and consolidation  -continue with duonebs PRN, RVP negative  -pending sputum sample  -continue with NC, humidified  -no abx or steroids at this time per Dr. Becker Patient with wheezing on admission and given solumedrol 125mg IV x 1 and nebs in ED  - pulm consulted, Dr. Becker  - CTA ruled out PE and consolidation  -continue with duonebs PRN, RVP negative  -sputum culture, f/u results   -continue with NC, humidified  -no abx or steroids at this time per Dr. Becker Patient with wheezing on admission and given solumedrol 125mg IV x 1 and nebs in ED  - pulm consulted, Dr. Becker  - CTA ruled out PE and consolidation  -continue with duonebs PRN, RVP negative  -sputum culture, f/u results   -continue with NC, humidified, pt de-sats without O2 to high 80's   -no abx or steroids at this time per Dr. Becker

## 2018-10-02 NOTE — PROGRESS NOTE ADULT - PROBLEM SELECTOR PLAN 1
continue to monitor on tele  - trops neg x 3  - ECHO 10/1: normal LV function, EF 60-65, Trace TR  - NST 10/2: normal   -US carotids B/L, f/u results  -EEG without seizure activity continue to monitor on tele  - trops neg x 3  - ECHO 10/1: normal LV function, EF 60-65, Trace TR  - NST 10/2: normal   -US carotids B/L, f/u results  -EEG without seizure activity  -f/u neuro recs

## 2018-10-02 NOTE — PROGRESS NOTE ADULT - SUBJECTIVE AND OBJECTIVE BOX
Interventional Cardiology PA Adult Progress Note    CC: syncope   Subjective Assessment: Pt seen and examined at bedside this AM. Pt without complaints this AM, cough has been improving. Pt denies SOB or CP.    12 point review of systems otherwise negative except for subjective HPI   	  MEDICATIONS:  amLODIPine   Tablet 10 milliGRAM(s) Oral daily      ALBUTerol/ipratropium for Nebulization 3 milliLiter(s) Nebulizer every 6 hours    ALPRAZolam 1 milliGRAM(s) Oral three times a day  melatonin 5 milliGRAM(s) Oral at bedtime  PARoxetine 30 milliGRAM(s) Oral daily    pantoprazole    Tablet 40 milliGRAM(s) Oral before breakfast    atorvastatin 10 milliGRAM(s) Oral at bedtime  levothyroxine 150 MICROGram(s) Oral daily    heparin  Injectable 5000 Unit(s) SubCutaneous every 8 hours  influenza   Vaccine 0.5 milliLiter(s) IntraMuscular once      	    [PHYSICAL EXAM:  TELEMETRY:  T(C): 36.7 (10-02-18 @ 13:45), Max: 37.1 (10-01-18 @ 22:09)  HR: 87 (10-02-18 @ 11:32) (62 - 87)  BP: 117/72 (10-02-18 @ 11:32) (115/59 - 124/65)  RR: 16 (10-02-18 @ 11:32) (16 - 16)  SpO2: 93% (10-02-18 @ 11:32) (93% - 96%)  Wt(kg): --  I&O's Summary    01 Oct 2018 07:01  -  02 Oct 2018 07:00  --------------------------------------------------------  IN: 180 mL / OUT: 0 mL / NET: 180 mL        Clemente:  Central/PICC/Mid Line:                                         Appearance: bruising and lacerations on face, well groomed, well appearing 	  HEENT:   Normal oral mucosa, PERRL, EOMI, R subconjunctival hemorrhage 	  Neck: Supple,  - JVD; Carotid Bruit   Cardiovascular: Normal S1 S2, No JVD, No murmurs,   Respiratory: Lungs clear to auscultation, No Rales, Rhonchi, Wheezing	  Gastrointestinal:  Soft, Non-tender, + BS	  Skin: No rashes, No ecchymoses, No cyanosis  Extremities: Normal range of motion, No clubbing, cyanosis or edema  Vascular: Peripheral pulses palpable 2+ bilaterally  Neurologic: Non-focal  Psychiatry: A & O x 3, Mood & affect appropriate      	    ECG:  	  RADIOLOGY:   DIAGNOSTIC TESTING:  [ ] Echocardiogram:  [ ]  Catheterization:  [ ] Stress Test:    [ ] CARMEN  OTHER: 	    LABS:	 	  CARDIAC MARKERS:                                  13.2   10.1  )-----------( 218      ( 02 Oct 2018 06:38 )             41.4     10-02    136  |  98  |  12  ----------------------------<  119<H>  3.9   |  29  |  0.57    Ca    9.2      02 Oct 2018 06:38  Mg     2.4     10-02      proBNP:   Lipid Profile:   HgA1c:   TSH:       ASSESSMENT/PLAN: 	        DVT ppx:  Dispo:

## 2018-10-02 NOTE — PROGRESS NOTE ADULT - SUBJECTIVE AND OBJECTIVE BOX
EEG in progress   stress test pending     PAST MEDICAL & SURGICAL HISTORY:  Depression  Hypothyroidism  Anxiety  Goiter  High cholesterol  HTN (hypertension)  S/P total knee replacement    MEDICATIONS  (STANDING):  ALBUTerol/ipratropium for Nebulization 3 milliLiter(s) Nebulizer every 6 hours  ALPRAZolam 1 milliGRAM(s) Oral three times a day  amLODIPine   Tablet 10 milliGRAM(s) Oral daily  atorvastatin 10 milliGRAM(s) Oral at bedtime  heparin  Injectable 5000 Unit(s) SubCutaneous every 8 hours  influenza   Vaccine 0.5 milliLiter(s) IntraMuscular once  levothyroxine 150 MICROGram(s) Oral daily  melatonin 5 milliGRAM(s) Oral at bedtime  pantoprazole    Tablet 40 milliGRAM(s) Oral before breakfast  PARoxetine 30 milliGRAM(s) Oral daily    MEDICATIONS  (PRN):      ICU Vital Signs Last 24 Hrs  T(C): 36.3 (02 Oct 2018 09:17), Max: 37.1 (01 Oct 2018 22:09)  T(F): 97.4 (02 Oct 2018 09:17), Max: 98.7 (01 Oct 2018 22:09)  HR: 66 (02 Oct 2018 08:30) (62 - 87)  BP: 121/67 (02 Oct 2018 08:30) (115/59 - 134/64)  BP(mean): 81 (01 Oct 2018 18:09) (81 - 81)  ABP: --  ABP(mean): --  RR: 16 (02 Oct 2018 08:30) (16 - 16)  SpO2: 95% (02 Oct 2018 08:30) (93% - 96%)    Lungs clear  PE ruled out   CV s1 s2  ekg NSR  NSSTTC    abd soft   ext stable                          13.2   10.1  )-----------( 218      ( 02 Oct 2018 06:38 )             41.4   10-02    136  |  98  |  12  ----------------------------<  119<H>  3.9   |  29  |  0.57    Ca    9.2      02 Oct 2018 06:38  Mg     2.4     10-02    TPro  7.2  /  Alb  4.3  /  TBili  0.5  /  DBili  x   /  AST  26  /  ALT  30  /  AlkPhos  77  09-30  CARDIAC MARKERS ( 01 Oct 2018 07:05 )  x     / <0.01 ng/mL / x     / x     / x      CARDIAC MARKERS ( 30 Sep 2018 22:51 )  x     / <0.01 ng/mL / x     / x     / x      CARDIAC MARKERS ( 30 Sep 2018 17:00 )  x     / <0.01 ng/mL / 100 U/L / x     / 1.8 ng/mL

## 2018-10-02 NOTE — PROGRESS NOTE ADULT - SUBJECTIVE AND OBJECTIVE BOX
ASSESSMENT/PLAN    1. O2  2. Bronchodilators:  Atrovent/ albuterol q 4 – 6 hours as needed  3. Corticosteroids:  4. ID/Antibiotics:  5. Cardiac/HTN:  6. GI: Rx/ prophylaxis c PPI/H2B  7. Heme: Rx/VT prophylaxis c SQH/SCD/AC  8. Aspiration precautions  Discussed with managing team Interventional, Pulmonary, Critical, Chest Special Procedures.    Pt was seen and fully examined by myself.     Time spent with patient in minutes:37    Patient is a 68y old  Female who presents with a chief complaint of syncope (01 Oct 2018 01:57)Events leading to this admission reviewed. The patient reports intermittent and spasmodic severe cough which possibly triggers syncopal episodes. The cough is intermittent productive to clear sputum. The patient denies aspiration, denies hemoptysis, denies fever. She has had a cat for 15yrs. The patient saw multiple specialists including Pulmonary, ENT, Alergist at [facility]. The patient reports no improvementt and if anything worsening of her symptoms despite extensive testing. The patient is now anxious, dry cough is present. She is eupneic on 2LNC when seen and pain free. Still bouts of severe cough, the pt was able to give sputum sample today.    HPI:  69yo F, former smoker (quit 1mo ago), with FHx MI (brother MI 62yo, father 43yo) and PMHx of HTN, HLD, hypothyroidism, anxiety and COPD who presented to [facility] ED on 10/1/18 complaining of cough and syncope x 4. Patient states first episode was when she was getting an outpatient CT scan at [facility] last Wed 9/26. She was walking along and suddenly woke up on the floor. She declined to be brought to the ED there at that time. Her next episode occurred while sitting on the couch when she suddenly awoke on the floor. Patient states she does not think she was out for more than a few seconds because the same comercial was still on TV. Yesterday she syncopized at the dinner table and her face went into her meal. Most recently today, she went for a walk and lost consciousness and awoke on the street. All incidents were preceded by lightheadedness and included head trauma. Patient has also been complaining of productive cough with white sputum x 3 weeks for which she has been followed by her PCP, pulm and ENT. She had CT scan of her sinuses that only revealed deviated septum and CT chest which revealed improvement in prior ?nodules. Patient states cough has been somewhat improving. Patient denies CP, palpitations, PND, orthopnea, N/V, hematochezia, melena, LE edema, tongue biting, evacuation of bladder/bowels, involuntary movement or seizure activity. She was scheduled for an outpatient stress test today and states she thinks she recently had an echocardiogram. Vitals on arrival to the ED were Temp 99.6F, HR 75bpm, /75, RR 18, O2 sat 96% RA. Labs significant for trops neg x 2. EKG revealed SR @ 70bpm with TWI in AVL, V2 and V3. CXR revealed small bandlike, patchy opacity across right lung base, likely representing atelectatic change. CT head revealed soft tissue injury of the left frontal scalp, no acute fracture, no intracranial hemorrhage. CTA ruled out PE and consolidation. In the ED, patient was given nebs, dTAP vaccine, solumedrol 125mg IV x 1, 1L NS. Patient is now admitted to [facility] for further workup of syncope. (01 Oct 2018 01:57)    REVIEW OF SYSTEMS: updated  Constitutional: No fever, weight loss, chills +++ fatigue  Eyes: No eye pain, visual disturbances, or discharge  ENMT:  No difficulty hearing, tinnitus, vertigo; No sinus or throat pain. No epistaxis, dysphagia,+ dysphonia, hoarseness no odynophagia  Neck: No pain, stiffness or neck swelling.  No masses or deformities  Respiratory: +++ cough, no wheezing, chills or hemoptysis  + COPD  - ILD   - PE   - ASTHMA     - PNEUMONIA  Cardiovascular: No chest pain, dysrhythmia, palpitations, dizziness or edema   +COPD     - CAD   - CHF   - HTN  Gastrointestinal: No abdominal or epigastric pain. No nausea, vomiting or hematemesis; No diarrhea or constipation. No melena or hematochezia. No dysphagia or Icterus.          Genitourinary: No dysuria, frequency, hematuria or incontinence   - CKD/CHAITANYA      - ESRD  Neurological: No headaches, memory loss, loss of strength, numbness or tremors      -DEMENTIA     - STROKE    - SEIZURE  Skin: No itching, burning, rashes or lesions   Lymph Nodes: No enlarged glands  Endocrine: No heat or cold intolerance; No hair loss       - DM     + THYROID DISORDER  Musculoskeletal: No joint pain or swelling; No muscle, back or extremity pain  Psychiatric: No depression, anxiety, mood swings or difficulty sleeping  Heme/Lymph: No easy bruising or bleeding gums         - ANEMIA      - CANCER   -COAGULOPATHY  Allergy and Immunologic: No hives or eczema    PAST MEDICAL & SURGICAL HISTORY:  Depression  Hypothyroidism  Anxiety  Goiter  High cholesterol  HTN (hypertension)  S/P total knee replacement    FAMILY HISTORY:  Family history of heart disease (Father)    SOCIAL HISTORY:      - Tobacco     - ETOH    Allergies    losartan (Angioedema)    Intolerances      Vital Signs Last 24 Hrs  T(C): 37.1 (01 Oct 2018 10:46), Max: 37.7 (01 Oct 2018 06:22)  T(F): 98.7 (01 Oct 2018 10:46), Max: 99.8 (01 Oct 2018 06:22)  HR: 80 (01 Oct 2018 08:42) (75 - 83)  BP: 124/60 (01 Oct 2018 08:42) (112/65 - 132/66)  BP(mean): 84 (01 Oct 2018 08:30) (83 - 84)  RR: 16 (01 Oct 2018 08:42) (16 - 18)  SpO2: 92% (01 Oct 2018 08:42) (92% - 96%)      PHYSICAL EXAM:  Un Comfortable, no immediate distress  Eyes: PERRL, EOM intact; conjunctiva injected, and sclera clear  Head: Normocephalic; + frontal ecchymoses Trauma  ENMT: No nasal discharge, +hoarseness, cough   Neck: Supple; non tender; no masses or deformities.    No JVD  Respiratory: - WHEEZING   + few scattered  RHONCHI  - RALES  + CRACKLES.  Diminished breath sounds  BILATERAL  RIGHT  LEFT bases today  Cardiovascular: Regular rate and rhythm. S1 and S2 Normal; No murmurs, gallops or rubs     - PPM/AICD  Gastrointestinal: Soft non-tender, non-distended; Normal bowel sounds; No hepatosplenomegaly.     -PEG    -  GT   - STACY  Genitourinary: No costovertebral angle tenderness. No dysuria  Extremities: AROM, No clubbing, cyanosis or edema    Vascular: Peripheral pulses palpable 2+ bilaterally  Neurological: Alert and responisve to stimuli   Skin: Warm and dry. No obvious rash  Lymph Nodes: No acute cervical or supraclavicular adenopathy  Psychiatric: Cooperative and anxious  DEVICES:  - DENTURES   +IV R / L     - ETUBE   -TRACH   -CTUBE  R / L      LABS:                          13.2   8.8   )-----------( 213      ( 01 Oct 2018 07:05 )             38.8     10-01    139  |  100  |  13  ----------------------------<  170<H>  4.5   |  23  |  0.57    Ca    9.6      01 Oct 2018 07:05  Mg     2.5     10-01    TPro  7.2  /  Alb  4.3  /  TBili  0.5  /  DBili  x   /  AST  26  /  ALT  30  /  AlkPhos  77  09-30      < from: CT Angio Chest PE Protocol w/ IV Cont (09.30.18 @ 22:48) >    EXAM:  CT ANGIO CHEST PE PROTOCOL IC                          PROCEDURE DATE:  09/30/2018          INTERPRETATION:  CTA (CT angiography) of the CHEST dated 9/30/2018 10:48   PM    INDICATION: Syncope/collapse. Assess for pulmonary embolism.    TECHNIQUE: CT angiography of the chest was performed during bolus   injection of intravenous contrast.  Post-processing including the   production of axial, coronal and sagittal multiplanar reformatted images   and axial and coronal maximum intensity projections (MIPs) was performed.   93 mL of Optiray 350 administered with 5 mL discarded.    PRIOR STUDY: None.    FINDINGS: No pulmonary embolism is seen. There is mild calcified plaque   in the aorta. No aortic dissection flap is seen. Moderate coronary artery   calcification is noted. The heart is normal in size. No pericardial   effusion is seen. No hilar or axillary lymphadenopathy is seen. There is   a nonspecific, mildly increased number of mediastinal lymph nodes without   abnormal enlargement of the nodes.    No pleural effusions are seen. Mild atelectatic changes are noted in the   bibasilar lungs. There is a 4 mm perifissural nodular opacity in the left   major fissure, image 136 series 6, most likely an intrapulmonary node.   Areas ofmucoid impaction in both lower lobes suggesting small airways   disease. Micronodule in the right upper lobe.    Limited evaluation of the upper abdomen demonstrates colonic   diverticulosis.     Evaluation of the osseous structures demonstrates moderate degenerative   changes of the spine. A hemangioma is noted in the vertebral body T10.      IMPRESSION:     1. No pulmonary embolism is seen. No consolidation.    < end of copied text >  RADIOLOGY & ADDITIONAL STUDIES (The following images were personally reviewed):  	  ASSESSMENT/[id number] F, former smoker (quit 1mo ago), with FHx MI (brother MI 62yo, father 43yo) and PMHx of HTN, HLD, hypothyroidism, anxiety and COPD who presented to [facility] ED on 10/1/18 complaining of severe cough, syncope. Severe and chronic tracheobronchitis c mucous impaction.    1. O2 2LNC humidify  2. Bronchodilators:  Atrovent/ albuterol q 4 – 6 hours as needed  3. Corticosteroids: off  4. ID/Antibiotics: off pending sputum G stain  5. Cardiac/HTN: Monitor, syncope cardiac work up  6. GI: Rx/ prophylaxis c PPI/H2B  7. Heme: Rx/VT prophylaxis c SQH/SCD  8. Aspiration precautions  9. May use Mucinex  10. Do not attempt PFTS at this time  11. Do not recommend Tessalon at this time  Discussed with managing team

## 2018-10-02 NOTE — CONSULT NOTE ADULT - SUBJECTIVE AND OBJECTIVE BOX
NEUROLOGY INITIAL CONSULT NOTE    CHIEF COMPLAINT: syncope    HPI:  67yo F, former smoker (quit 1mo ago), with FHx MI (brother MI 62yo, father 43yo) and PMHx of HTN, HLD, hypothyroidism, anxiety and COPD who presented to [facility] ED on 10/1/18 complaining of severe cough and syncope x 4. Patient states first episode was when she was getting an outpatient CT scan at [facility] last Wed 9/26. She was walking along and suddenly woke up on the floor. She declined to be brought to the ED there at that time. Her next episode occurred while sitting on the couch when she suddenly awoke on the floor. Patient states she does not think she was out for more than a few seconds because the same commercial was still on TV. two days ago at the dinner table and  pt reports her face went into her meal. Most recently om day of admission, she went for a walk and lost consciousness and awoke on the street. Patient has also been complaining of productive cough with white sputum x 3 weeks for which she has been followed by her PCP, pulm and ENT. She had CT scan of her sinuses that only revealed deviated septum and CT chest which revealed improvement in prior ?nodules. Patient states cough has been somewhat improving. Patient denies CP, palpitations, PND, orthopnea, N/V, hematochezia, melena, LE edema, tongue biting, evacuation of bladder/bowels, involuntary movement or seizure activity.     Pt seen and examined at bedside. Sitting up comfortably in chair. NAD. No respiratory distress. Pt reports she has never synopsized in the past. SHe admit to feeling very nervous and anxious preceding her first syncopal episode before getting th CT scan of her sinuses. Also reports one of the episodes was preceded by a heavy coughing bout. While at the dinner table, pt reported feeling generally well and denies prodromal symptoms. She reports otherwise, prior to every syncopal episode, no feelings of numbness, tingling, visual loss, feeling hot, dizziness, HA, nausea/vomiting, CP, palpitations, SOB, weakness, slurred speech, cloudy mentation, pain in her extremities. Denies witnesses reporting tonic clonic motions, bowel/bladder incontinence. No tongue biting. Pt recovered after several seconds and without confusion afterwards. No h/o CVA, seizures, arrhythmia, structural heart abnormalities.       PAST MEDICAL & SURGICAL HISTORY:  Depression  Hypothyroidism  Anxiety  Goiter  High cholesterol  HTN (hypertension)  S/P total knee replacement      REVIEW OF SYSTEMS:  As per HPI, otherwise negative for Constitutional, Eyes, Ears/Nose/Mouth/Throat, Neck, Cardiovascular, Respiratory, Gastrointestinal, Genitourinary, Skin, Endocrine, Musculoskeletal, Psychiatric, and Hematologic/Lymphatic.    MEDICATIONS  (STANDING):  ALBUTerol/ipratropium for Nebulization 3 milliLiter(s) Nebulizer every 6 hours  ALPRAZolam 1 milliGRAM(s) Oral three times a day  amLODIPine   Tablet 10 milliGRAM(s) Oral daily  atorvastatin 10 milliGRAM(s) Oral at bedtime  heparin  Injectable 5000 Unit(s) SubCutaneous every 8 hours  influenza   Vaccine 0.5 milliLiter(s) IntraMuscular once  levothyroxine 150 MICROGram(s) Oral daily  melatonin 5 milliGRAM(s) Oral at bedtime  pantoprazole    Tablet 40 milliGRAM(s) Oral before breakfast  PARoxetine 30 milliGRAM(s) Oral daily    MEDICATIONS  (PRN):      Allergies    losartan (Angioedema)    Intolerances        FAMILY HISTORY:  Family history of heart disease (Father)      SOCIAL HISTORY:  Living Situation:  Occupation:  Tobacco:  Alcohol:   Drug use:      VITAL SIGNS:  Vital Signs Last 24 Hrs  T(C): 36.7 (02 Oct 2018 13:45), Max: 37.1 (01 Oct 2018 22:09)  T(F): 98.1 (02 Oct 2018 13:45), Max: 98.7 (01 Oct 2018 22:09)  HR: 87 (02 Oct 2018 11:32) (62 - 87)  BP: 117/72 (02 Oct 2018 11:32) (115/59 - 134/64)  BP(mean): 90 (02 Oct 2018 11:32) (81 - 90)  RR: 16 (02 Oct 2018 11:32) (16 - 16)  SpO2: 93% (02 Oct 2018 11:32) (93% - 96%)    PHYSICAL EXAMINATION:  Constitutional: WDWN; NAD, sitting up in chair comfortably  Eyes: R conjunctival hemorrhage, EOMI, PERRL  Cardiovascular: Regular rate and rhythm; S1 and S2 Normal  Neurologic:  - Mental Status:  AAOx3; speech is fluent with intact naming, repetition, and comprehension; immediate recall is 3/3 words and delayed recall is 3/3 words at 5 minutes; able to spell WORLD backwards and perform serial 7 subtraction; able to read and write a sentence; able to copy a cube; Good overall fund of knowledge.  - Cranial Nerves II-XII:    II:  Visual acuity is 20/20 bilaterally; Visual fields are full to confrontation; Fundoscopic exam is normal with sharp discs; Pupils are equal, round, and reactive to light.  III, IV, VI:  Extraocular movements are intact without nystagmus.  V:  Facial sensation is intact in the V1-V3 distribution bilaterally.  VII:  Face is symmetric with normal eye closure and smile  VIII:  Hearing is intact to finger rub.  IX, X:  Uvula is midline and soft palate rises symmetrically  XI:  Head turning and shoulder shrug are intact.  XII:  Tongue protrudes in the midline.  - Motor:  Strength is 5/5 throughout.  There is no pronator drift.  Normal muscle bulk and tone throughout.  - Reflexes:  2+ and symmetric at the biceps, triceps, brachioradialis, knees, and ankles.  Plantar responses flexor.  - Sensory:  Intact to light touch, pin prick, vibration, and joint-position sense throughout.  - Coordination:  Finger-nose-finger and heel-knee-shin intact without dysmetria.  Rapid alternating hand movements intact.  - Gait:  deferred    LABS:                        13.2   10.1  )-----------( 218      ( 02 Oct 2018 06:38 )             41.4     10-02    136  |  98  |  12  ----------------------------<  119<H>  3.9   |  29  |  0.57    Ca    9.2      02 Oct 2018 06:38  Mg     2.4     10-02    TPro  7.2  /  Alb  4.3  /  TBili  0.5  /  DBili  x   /  AST  26  /  ALT  30  /  AlkPhos  77  09-30          RADIOLOGY & ADDITIONAL STUDIES:      < from: CT Head No Cont (09.30.18 @ 22:47) >  FINDINGS:    VENTRICLES AND SULCI: No hydrocephalus. There is age-appropriate   parenchymal volume loss.  INTRA-AXIAL: There is hypodensity of the periventricular white matter   suggestive of mild microangiopathic disease. No intracranial mass effect,   acute hemorrhage, or midline shift is present. Gray-white differentiation   is maintained.  EXTRA-AXIAL: No extra-axial fluid collection is present.   VISUALIZED SINUSES: No air-fluid levels are identified.   VISUALIZED MASTOIDS:  Clear.  CALVARIUM:  No fracture.  MISCELLANEOUS:  There is mild to moderate swelling of the left frontal   scalp. The ocular lenses are not well visualized, most likely due to   prior cataract surgery.    IMPRESSION:    No acute intracranial hemorrhage or calvarial fracture. Soft tissue   injury of the left frontal scalp.    < end of copied text >      IMPRESSION & RECOMMENDATIONS:    68F, former smoker (quit 1mo ago), with FHx MI (brother MI 62yo, father 43yo) and PMHx of HTN, HLD, hypothyroidism, anxiety and COPD who presented to [facility] ED on 10/1/18 complaining of severe cough and syncope x 4. NEUROLOGY INITIAL CONSULT NOTE    CHIEF COMPLAINT: syncope    HPI:  69yo F, former smoker (quit 1mo ago), with FHx MI (brother MI 62yo, father 43yo) and PMHx of HTN, HLD, hypothyroidism, anxiety and COPD who presented to [facility] ED on 10/1/18 complaining of severe cough and syncope x 4. Patient states first episode was when she was getting an outpatient CT scan at [facility] last Wed 9/26. She was walking along and suddenly woke up on the floor. She declined to be brought to the ED there at that time. Her next episode occurred while sitting on the couch when she suddenly awoke on the floor. Patient states she does not think she was out for more than a few seconds because the same commercial was still on TV. two days ago at the dinner table and  pt reports her face went into her meal. Most recently om day of admission, she went for a walk and lost consciousness and awoke on the street. Patient has also been complaining of productive cough with white sputum x 3 weeks for which she has been followed by her PCP, pulm and ENT. She had CT scan of her sinuses that only revealed deviated septum and CT chest which revealed improvement in prior ?nodules. Patient states cough has been somewhat improving. Patient denies CP, palpitations, PND, orthopnea, N/V, hematochezia, melena, LE edema, tongue biting, evacuation of bladder/bowels, involuntary movement or seizure activity.     Pt seen and examined at bedside. Sitting up comfortably in chair. NAD. No respiratory distress. Pt reports she has never synopsized in the past. SHe admit to feeling very nervous and anxious preceding her first syncopal episode before getting th CT scan of her sinuses. Also reports one of the episodes was preceded by a heavy coughing bout. While at the dinner table, pt reported feeling generally well and denies prodromal symptoms. She reports otherwise, prior to every syncopal episode, no feelings of numbness, tingling, visual loss, feeling hot, dizziness, HA, nausea/vomiting, CP, palpitations, SOB, weakness, slurred speech, cloudy mentation, pain in her extremities. Denies witnesses reporting tonic clonic motions, bowel/bladder incontinence. No tongue biting. Pt recovered after several seconds and without confusion afterwards. No h/o CVA, seizures, arrhythmia, structural heart abnormalities.       PAST MEDICAL & SURGICAL HISTORY:  Depression  Hypothyroidism  Anxiety  Goiter  High cholesterol  HTN (hypertension)  S/P total knee replacement      REVIEW OF SYSTEMS:  As per HPI, otherwise negative for Constitutional, Eyes, Ears/Nose/Mouth/Throat, Neck, Cardiovascular, Respiratory, Gastrointestinal, Genitourinary, Skin, Endocrine, Musculoskeletal, Psychiatric, and Hematologic/Lymphatic.    MEDICATIONS  (STANDING):  ALBUTerol/ipratropium for Nebulization 3 milliLiter(s) Nebulizer every 6 hours  ALPRAZolam 1 milliGRAM(s) Oral three times a day  amLODIPine   Tablet 10 milliGRAM(s) Oral daily  atorvastatin 10 milliGRAM(s) Oral at bedtime  heparin  Injectable 5000 Unit(s) SubCutaneous every 8 hours  influenza   Vaccine 0.5 milliLiter(s) IntraMuscular once  levothyroxine 150 MICROGram(s) Oral daily  melatonin 5 milliGRAM(s) Oral at bedtime  pantoprazole    Tablet 40 milliGRAM(s) Oral before breakfast  PARoxetine 30 milliGRAM(s) Oral daily    MEDICATIONS  (PRN):      Allergies    losartan (Angioedema)    Intolerances        FAMILY HISTORY:  Family history of heart disease (Father)      SOCIAL HISTORY:  Living Situation:  Occupation:  Tobacco:  Alcohol:   Drug use:      VITAL SIGNS:  Vital Signs Last 24 Hrs  T(C): 36.7 (02 Oct 2018 13:45), Max: 37.1 (01 Oct 2018 22:09)  T(F): 98.1 (02 Oct 2018 13:45), Max: 98.7 (01 Oct 2018 22:09)  HR: 87 (02 Oct 2018 11:32) (62 - 87)  BP: 117/72 (02 Oct 2018 11:32) (115/59 - 134/64)  BP(mean): 90 (02 Oct 2018 11:32) (81 - 90)  RR: 16 (02 Oct 2018 11:32) (16 - 16)  SpO2: 93% (02 Oct 2018 11:32) (93% - 96%)    PHYSICAL EXAMINATION:  Constitutional: WDWN; NAD, sitting up in chair comfortably  Eyes: R conjunctival hemorrhage, EOMI, PERRL  Cardiovascular: Regular rate and rhythm; S1 and S2 Normal  Neurologic:  - Mental Status:  AAOx3; speech is fluent with intact naming, repetition, and comprehension; immediate recall is 3/3 words and delayed recall is 3/3 words at 5 minutes; able to spell WORLD backwards and perform serial 7 subtraction; able to read and write a sentence; able to copy a cube; Good overall fund of knowledge.  - Cranial Nerves II-XII:    II:  Visual acuity is 20/20 bilaterally; Visual fields are full to confrontation; Fundoscopic exam is normal with sharp discs; Pupils are equal, round, and reactive to light.  III, IV, VI:  Extraocular movements are intact without nystagmus.  V:  Facial sensation is intact in the V1-V3 distribution bilaterally.  VII:  Face is symmetric with normal eye closure and smile  VIII:  Hearing is intact to finger rub.  IX, X:  Uvula is midline and soft palate rises symmetrically  XI:  Head turning and shoulder shrug are intact.  XII:  Tongue protrudes in the midline.  - Motor:  Strength is 5/5 throughout.  There is no pronator drift.  Normal muscle bulk and tone throughout.  - Reflexes:  2+ and symmetric at the biceps, triceps, brachioradialis, knees, and ankles.  Plantar responses flexor.  - Sensory:  Intact to light touch, pin prick, vibration, and joint-position sense throughout.  - Coordination:  Finger-nose-finger and heel-knee-shin intact without dysmetria.  Rapid alternating hand movements intact.  - Gait:  deferred    LABS:                        13.2   10.1  )-----------( 218      ( 02 Oct 2018 06:38 )             41.4     10-02    136  |  98  |  12  ----------------------------<  119<H>  3.9   |  29  |  0.57    Ca    9.2      02 Oct 2018 06:38  Mg     2.4     10-02    TPro  7.2  /  Alb  4.3  /  TBili  0.5  /  DBili  x   /  AST  26  /  ALT  30  /  AlkPhos  77  09-30          RADIOLOGY & ADDITIONAL STUDIES:      < from: CT Head No Cont (09.30.18 @ 22:47) >  FINDINGS:    VENTRICLES AND SULCI: No hydrocephalus. There is age-appropriate   parenchymal volume loss.  INTRA-AXIAL: There is hypodensity of the periventricular white matter   suggestive of mild microangiopathic disease. No intracranial mass effect,   acute hemorrhage, or midline shift is present. Gray-white differentiation   is maintained.  EXTRA-AXIAL: No extra-axial fluid collection is present.   VISUALIZED SINUSES: No air-fluid levels are identified.   VISUALIZED MASTOIDS:  Clear.  CALVARIUM:  No fracture.  MISCELLANEOUS:  There is mild to moderate swelling of the left frontal   scalp. The ocular lenses are not well visualized, most likely due to   prior cataract surgery.    IMPRESSION:    No acute intracranial hemorrhage or calvarial fracture. Soft tissue   injury of the left frontal scalp.    < end of copied text >      IMPRESSION & RECOMMENDATIONS:    68F, former smoker (quit 1mo ago), with FHx MI (brother MI 62yo, father 43yo) and PMHx of HTN, HLD, hypothyroidism, anxiety and COPD who presented to [facility] ED on 10/1/18 complaining of severe cough and syncope x 4.   -EEG w/o epileptiform changes    Recommendations:  -continue syncope w/u per primary team  -f/u orthostatics

## 2018-10-02 NOTE — EEG REPORT - NS EEG TEXT BOX
[facility] Department of Neurology  Inpatient Continuous Video Electroencephalography Report    Patient Name:	NATALYA IVORY    :	1950  MRN:	[id number]    Study Start Date/Time:  10/1/2018, 6:20:48 PM  Study End Date/Time:	    Referred by: Dr. Jack Wiggins    Brief Clinical History:  NATALYA IVORY is a 67yo F, former smoker, with FHx MI and PMHx of HTN, HLD, hypothyroidism, anxiety and COPD admitted for cough and syncope x 4..      Diagnosis Code:   R56.9 convulsions/seizure  CPT: 31933 vEEG 12-24 hours    Acquisition Details:  Electroencephalography was acquired using a minimum of 21 channels on an [employer] system v 8.5.1 with electrode placement according to the standard International 10-20 system following ACNS (American Clinical Neurophysiology Society) guidelines for Long-Term Video EEG monitoring.  Anterior temporal T1 and T2 electrodes were utilized whenever possible. The XLTEK automated spike & seizure detections were all reviewed in detail, in addition to extensive portions of raw EEG.    Day 1: 10/1/2018 @ 6:20:48 PM to next morning @ 0700  Pertinent medications: Xanax 1 mg TID  Background   Symmetry: Symmetric.  Frequencies: Predominantly alpha overriding faster frequencies  Anterior-posterior (AP) gradient: Present.  Posterior Dominant Rhythm: 10 Hz symmetric, well-organized, and well-modulated.  Voltage:  Normal (most activity >20 uV).  Continuity: continuous,   Variability: Yes. 						  Reactivity: Yes.  Breach: No.  N2 sleep: Symmetric spindles and K complexes.  Epileptiform discharges:  No epileptiform discharges.  Abnormal periodic/rhythmic activity: No .  Events:  No electrographic seizures or paroxysmal clinical events.  Provacations: Hyperventilation and photic were not performed  Other findings: Fp1 electrode artifact  ECG: Normal sinus rhythm    Daily summary and impression:    Excess diffuse beta activity which is a normal variant and may also be seen with certain sedative medications (e.g. benzodiazepines). There were no electrographic seizures, epileptiform discharges, or paroxysmal clinical events.    Read by:  Sheila Abel MD [facility] Department of Neurology  Inpatient Continuous Video Electroencephalography Report    Patient Name:	NATALYA IVORY    :	1950  MRN:	[id number]    Study Start Date/Time:  10/1/2018, 6:20:48 PM  Study End Date/Time:	    Referred by: Dr. Jack Wiggins    Brief Clinical History:  NATALYA IVORY is a 69yo F, former smoker, with FHx MI and PMHx of HTN, HLD, hypothyroidism, anxiety and COPD admitted for cough and syncope x 4..      Diagnosis Code:   R56.9 convulsions/seizure  CPT: 60498 vEEG 12-24 hours    Acquisition Details:  Electroencephalography was acquired using a minimum of 21 channels on an [employer] system v 8.5.1 with electrode placement according to the standard International 10-20 system following ACNS (American Clinical Neurophysiology Society) guidelines for Long-Term Video EEG monitoring.  Anterior temporal T1 and T2 electrodes were utilized whenever possible. The XLTEK automated spike & seizure detections were all reviewed in detail, in addition to extensive portions of raw EEG.    Day 1: 10/1/2018 @ 6:20:48 PM to next morning @ 0700  Pertinent medications: Xanax 1 mg TID  Background   Symmetry: Symmetric.  Frequencies: Predominantly alpha overriding faster frequencies  Anterior-posterior (AP) gradient: Present.  Posterior Dominant Rhythm: 10 Hz symmetric, well-organized, and well-modulated.  Voltage:  Normal (most activity >20 uV).  Continuity: continuous,   Variability: Yes. 						  Reactivity: Yes.  Breach: No.  N2 sleep: Symmetric spindles and K complexes.  Epileptiform discharges:  No epileptiform discharges.  Abnormal periodic/rhythmic activity: No .  Events:  No electrographic seizures or paroxysmal clinical events.  Provacations: Hyperventilation and photic were not performed  Other findings: Fp1 electrode artifact    Daily summary and impression:    Excess diffuse beta activity which is a normal variant and may also be seen with certain sedative medications (e.g. benzodiazepines). There were no electrographic seizures, epileptiform discharges, or paroxysmal clinical events.    Read by:  Sheila Abel MD

## 2018-10-03 LAB
ANION GAP SERPL CALC-SCNC: 11 MMOL/L — SIGNIFICANT CHANGE UP (ref 5–17)
BUN SERPL-MCNC: 14 MG/DL — SIGNIFICANT CHANGE UP (ref 7–23)
CALCIUM SERPL-MCNC: 9.5 MG/DL — SIGNIFICANT CHANGE UP (ref 8.4–10.5)
CHLORIDE SERPL-SCNC: 98 MMOL/L — SIGNIFICANT CHANGE UP (ref 96–108)
CO2 SERPL-SCNC: 27 MMOL/L — SIGNIFICANT CHANGE UP (ref 22–31)
CREAT SERPL-MCNC: 0.64 MG/DL — SIGNIFICANT CHANGE UP (ref 0.5–1.3)
GLUCOSE SERPL-MCNC: 111 MG/DL — HIGH (ref 70–99)
GRAM STN FLD: SIGNIFICANT CHANGE UP
HCT VFR BLD CALC: 42.8 % — SIGNIFICANT CHANGE UP (ref 34.5–45)
HGB BLD-MCNC: 13.7 G/DL — SIGNIFICANT CHANGE UP (ref 11.5–15.5)
MAGNESIUM SERPL-MCNC: 2.4 MG/DL — SIGNIFICANT CHANGE UP (ref 1.6–2.6)
MCHC RBC-ENTMCNC: 32 G/DL — SIGNIFICANT CHANGE UP (ref 32–36)
MCHC RBC-ENTMCNC: 34 PG — SIGNIFICANT CHANGE UP (ref 27–34)
MCV RBC AUTO: 106.2 FL — HIGH (ref 80–100)
PLATELET # BLD AUTO: 230 K/UL — SIGNIFICANT CHANGE UP (ref 150–400)
POTASSIUM SERPL-MCNC: 4.2 MMOL/L — SIGNIFICANT CHANGE UP (ref 3.5–5.3)
POTASSIUM SERPL-SCNC: 4.2 MMOL/L — SIGNIFICANT CHANGE UP (ref 3.5–5.3)
RBC # BLD: 4.03 M/UL — SIGNIFICANT CHANGE UP (ref 3.8–5.2)
RBC # FLD: 13.3 % — SIGNIFICANT CHANGE UP (ref 10.3–16.9)
SODIUM SERPL-SCNC: 136 MMOL/L — SIGNIFICANT CHANGE UP (ref 135–145)
SPECIMEN SOURCE: SIGNIFICANT CHANGE UP
WBC # BLD: 9.6 K/UL — SIGNIFICANT CHANGE UP (ref 3.8–10.5)
WBC # FLD AUTO: 9.6 K/UL — SIGNIFICANT CHANGE UP (ref 3.8–10.5)

## 2018-10-03 PROCEDURE — 99232 SBSQ HOSP IP/OBS MODERATE 35: CPT

## 2018-10-03 RX ORDER — FLUTICASONE PROPIONATE 50 MCG
1 SPRAY, SUSPENSION NASAL
Qty: 0 | Refills: 0 | Status: DISCONTINUED | OUTPATIENT
Start: 2018-10-03 | End: 2018-10-04

## 2018-10-03 RX ORDER — AZITHROMYCIN 500 MG/1
500 TABLET, FILM COATED ORAL ONCE
Qty: 0 | Refills: 0 | Status: COMPLETED | OUTPATIENT
Start: 2018-10-03 | End: 2018-10-03

## 2018-10-03 RX ORDER — AZITHROMYCIN 500 MG/1
500 TABLET, FILM COATED ORAL DAILY
Qty: 0 | Refills: 0 | Status: DISCONTINUED | OUTPATIENT
Start: 2018-10-04 | End: 2018-10-04

## 2018-10-03 RX ADMIN — Medication 1 MILLIGRAM(S): at 21:32

## 2018-10-03 RX ADMIN — Medication 1 MILLIGRAM(S): at 06:19

## 2018-10-03 RX ADMIN — PANTOPRAZOLE SODIUM 40 MILLIGRAM(S): 20 TABLET, DELAYED RELEASE ORAL at 06:19

## 2018-10-03 RX ADMIN — Medication 1 DROP(S): at 18:30

## 2018-10-03 RX ADMIN — ATORVASTATIN CALCIUM 10 MILLIGRAM(S): 80 TABLET, FILM COATED ORAL at 21:32

## 2018-10-03 RX ADMIN — Medication 1 SPRAY(S): at 18:30

## 2018-10-03 RX ADMIN — AZITHROMYCIN 500 MILLIGRAM(S): 500 TABLET, FILM COATED ORAL at 13:56

## 2018-10-03 RX ADMIN — Medication 600 MILLIGRAM(S): at 13:57

## 2018-10-03 RX ADMIN — Medication 3 MILLILITER(S): at 12:11

## 2018-10-03 RX ADMIN — HEPARIN SODIUM 5000 UNIT(S): 5000 INJECTION INTRAVENOUS; SUBCUTANEOUS at 21:32

## 2018-10-03 RX ADMIN — AMLODIPINE BESYLATE 10 MILLIGRAM(S): 2.5 TABLET ORAL at 06:19

## 2018-10-03 RX ADMIN — HEPARIN SODIUM 5000 UNIT(S): 5000 INJECTION INTRAVENOUS; SUBCUTANEOUS at 06:19

## 2018-10-03 RX ADMIN — Medication 150 MICROGRAM(S): at 06:19

## 2018-10-03 RX ADMIN — Medication 3 MILLILITER(S): at 18:30

## 2018-10-03 RX ADMIN — Medication 3 MILLILITER(S): at 06:19

## 2018-10-03 RX ADMIN — Medication 30 MILLIGRAM(S): at 12:11

## 2018-10-03 RX ADMIN — HEPARIN SODIUM 5000 UNIT(S): 5000 INJECTION INTRAVENOUS; SUBCUTANEOUS at 13:56

## 2018-10-03 RX ADMIN — Medication 3 MILLILITER(S): at 00:34

## 2018-10-03 NOTE — PROGRESS NOTE ADULT - SUBJECTIVE AND OBJECTIVE BOX
Interventional, Pulmonary, Critical, Chest Special Procedures.    Pt was seen and fully examined by myself.     Time spent with patient in minutes: 37    Patient is a 68y old  Female who presents with a chief complaint of syncope (03 Oct 2018 13:05) The patient feels better on 2LNC humidified O2, cough attacks are less today, intermittent productive cough today.    HPI:  69yo F, former smoker (quit 1mo ago), with FHx MI (brother MI 64yo, father 43yo) and PMHx of HTN, HLD, hypothyroidism, anxiety and COPD who presented to [facility] ED on 10/1/18 complaining of cough and syncope x 4. Patient states first episode was when she was getting an outpatient CT scan at [facility] last Wed 9/26. She was walking along and suddenly woke up on the floor. She declined to be brought to the ED there at that time. Her next episode occurred while sitting on the couch when she suddenly awoke on the floor. Patient states she does not think she was out for more than a few seconds because the same comercial was still on TV. Yesterday she syncopized at the dinner table and her face went into her meal. Most recently today, she went for a walk and lost consciousness and awoke on the street. All incidents were preceded by lightheadedness and included head trauma. Patient has also been complaining of productive cough with white sputum x 3 weeks for which she has been followed by her PCP, pulm and ENT. She had CT scan of her sinuses that only revealed deviated septum and CT chest which revealed improvement in prior ?nodules. Patient states cough has been somewhat improving. Patient denies CP, palpitations, PND, orthopnea, N/V, hematochezia, melena, LE edema, tongue biting, evacuation of bladder/bowels, involuntary movement or seizure activity. She was scheduled for an outpatient stress test today and states she thinks she recently had an echocardiogram. Vitals on arrival to the ED were Temp 99.6F, HR 75bpm, /75, RR 18, O2 sat 96% RA. Labs significant for trops neg x 2. EKG revealed SR @ 70bpm with TWI in AVL, V2 and V3. CXR revealed small bandlike, patchy opacity across right lung base, likely representing atelectatic change. CT head revealed soft tissue injury of the left frontal scalp, no acute fracture, no intracranial hemorrhage. CTA ruled out PE and consolidation. In the ED, patient was given nebs, dTAP vaccine, solumedrol 125mg IV x 1, 1L NS. Patient is now admitted to [facility] for further workup of syncope. (01 Oct 2018 01:57)    REVIEW OF SYSTEMS: updated  Constitutional: No fever, weight loss, chills or fatigue  Eyes: No eye pain, visual disturbances, or discharge  ENMT:  No difficulty hearing, tinnitus, vertigo; No sinus or throat pain. No epistaxis, dysphagia, dysphonia, hoarseness or odynophagia  Neck: No pain, stiffness or neck swelling.  No masses or deformities  Respiratory: No cough, wheezing, chills or hemoptysis  - COPD  - ILD   - PE   - ASTHMA     - PNEUMONIA  Cardiovascular: No chest pain, dysrhythmia, palpitations, dizziness or edema   - COPD     - CAD   - CHF   - HTN  Gastrointestinal: No abdominal or epigastric pain. No nausea, vomiting or hematemesis; No diarrhea or constipation. No melena or hematochezia. No dysphagia or Icterus.          Genitourinary: No dysuria, frequency, hematuria or incontinence   - CKD/CHAITANYA      - ESRD  Neurological: No headaches, memory loss, loss of strength, numbness or tremors      -DEMENTIA     - STROKE    - SEIZURE  Skin: No itching, burning, rashes or lesions   Lymph Nodes: No enlarged glands  Endocrine: No heat or cold intolerance; No hair loss       - DM     - THYROID DISORDER  Musculoskeletal: No joint pain or swelling; No muscle, back or extremity pain  Psychiatric: No depression, anxiety, mood swings or difficulty sleeping  Heme/Lymph: No easy bruising or bleeding gums         - ANEMIA      - CANCER   -COAGULOPATHY  Allergy and Immunologic: No hives or eczema    PAST MEDICAL & SURGICAL HISTORY:  Depression  Hypothyroidism  Anxiety  Goiter  High cholesterol  HTN (hypertension)  S/P total knee replacement    FAMILY HISTORY:  Family history of heart disease (Father)    SOCIAL HISTORY:      - Tobacco     - ETOH    Allergies    losartan (Angioedema)    Intolerances      Vital Signs Last 24 Hrs  T(C): 36.6 (03 Oct 2018 14:16), Max: 37.1 (03 Oct 2018 00:43)  T(F): 97.8 (03 Oct 2018 14:16), Max: 98.8 (03 Oct 2018 10:27)  HR: 83 (03 Oct 2018 12:12) (64 - 84)  BP: 130/71 (03 Oct 2018 12:12) (94/54 - 146/77)  BP(mean): 94 (03 Oct 2018 12:12) (94 - 104)  RR: 16 (03 Oct 2018 12:12) (16 - 19)  SpO2: 95% (03 Oct 2018 12:12) (93% - 96%)    10-03 @ 07:01  -  10-03 @ 15:00  --------------------------------------------------------  IN: 900 mL / OUT: 0 mL / NET: 900 mL        PHYSICAL EXAM:  Un Comfortable, no immediate distress  Eyes: PERRL, EOM intact; conjunctiva injected, and sclera clear  Head: Normocephalic; + frontal ecchymoses Trauma  ENMT: No nasal discharge, +hoarseness, cough   Neck: Supple; non tender; no masses or deformities.    No JVD  Respiratory: - WHEEZING   + few scattered  RHONCHI  - RALES  + CRACKLES.  Diminished breath sounds  BILATERAL  RIGHT  LEFT bases today  Cardiovascular: Regular rate and rhythm. S1 and S2 Normal; No murmurs, gallops or rubs     - PPM/AICD  Gastrointestinal: Soft non-tender, non-distended; Normal bowel sounds; No hepatosplenomegaly.     -PEG    -  GT   - STACY  Genitourinary: No costovertebral angle tenderness. No dysuria  Extremities: AROM, No clubbing, cyanosis or edema    Vascular: Peripheral pulses palpable 2+ bilaterally  Neurological: Alert and responisve to stimuli   Skin: Warm and dry. No obvious rash  Lymph Nodes: No acute cervical or supraclavicular adenopathy  Psychiatric: Cooperative and anxious    DEVICES:  - DENTURES   +IV R / L     - ETUBE   -TRACH   -CTUBE  R / L      LABS:                          13.7   9.6   )-----------( 230      ( 03 Oct 2018 06:32 )             42.8     10-03    136  |  98  |  14  ----------------------------<  111<H>  4.2   |  27  |  0.64    Ca    9.5      03 Oct 2018 06:32  Mg     2.4     10-03    Culture - Sputum . (10.02.18 @ 08:38)    Gram Stain:   Moderate epithelial cells  Moderate WBC's  Numerous Gram positive cocci in pairs, chains and clusters  Moderate Gram Positive Rods  Rare Yeast      < from: Pharm Thallium Stress (Lexiscan) -LEXMIB (10.02.18 @ 15:56) >    EXAM:  PHARM STRESS THALL MARIO-MIBI+                          EXAM:  MYOCARD PERF SPECT MULTI                          EXAM:  EKG STRESS TEST (CARDIOL)                          PROCEDURE DATE:  10/02/2018          INTERPRETATION:  Dear Dr. Wiggins:    Your patient, Malena Aparicio, a 68 year-old-female, was evaluated in our   laboratory on October 2, 2018 by stress sestamibi/rest thallium separate   acquisition SPECT myocardial perfusion imaging.     Indication:  Diagnosis of coronary artery disease    Clinical History:  The patient is a 68 year-old-woman without known coronary artery disease.   Her current symptoms include syncope. Her weight is 173 lbs and her   height is 61 inches.    Procedure:    A resting injection of 1.3 mCi of Tl-201 was made intravenously and   semi-supine tomographic images were obtained ten minutes later. Following   the acquisition of the resting images .4 mg of regadenoson was given by   IV push. Thirty seconds after the administration of the regadenoson, 6.6   mCi of Tc-99m sestamibi was injected. Semi-upright and semi-supine gated   tomographic images were obtained sixty minutes after the injection of   Tc-99m sestamibi.        Findings:  The resting heart rate was 68 bpm and the resting blood pressure was   90/60 mm Hg. A peak heart rate of 85 beats per minute and a blood   pressure of 90/52 mm Hg were recorded during stress. The patient did not   develop any symptoms other than fatigue during the procedure.    The resting EKG revealed normal sinus rhythm. Neither ST segment   depression nor cardiac arrhythmias were noted during the stress or   recovery periods.    The overall quality of the imaging is excellent. Visual analysis of the   rest and stress tomographic images demonstrates a physiologic   distribution of tracer throughout the myocardium. Quantitative analysis   does not demonstrate any significant areas of decreased tracer   concentration. The Total Perfusion Defect (TPD) is 0%. There is no   evidence of ischemic dilation of the left ventricle. The gated images   demonstrate normal left ventricular chamber size, wall motion and   myocardial thickening. The post-stress ejection fraction is 70% and the   rest ejection fraction is 71%. Additionally the right ventricle is normal.    EF (%)                      70                                    LLN = 50    EDV (ml)                  74                                    ULN =100    ESV (ml)                   23                                    ULN = 42                   TPD  Normal    <2%  Equivocal    2-4%  Mild      5-9%  Moderate    10-14%  Severe    >14%    Impression:    Myocardial perfusion imaging is normal. Overall left ventricular systolic   function is normal without regional wall motion abnormalities. The EKG   stress test is normal.    < end of copied text >      RADIOLOGY & ADDITIONAL STUDIES (The following images were personally reviewed):

## 2018-10-03 NOTE — PROGRESS NOTE ADULT - PROBLEM SELECTOR PLAN 2
Patient with wheezing on admission and given solumedrol 125mg IV x 1 and nebs in ED  - pulm consulted, Dr. Becker  - CTA ruled out PE and consolidation  -continue with duonebs PRN, RVP negative  -Zithromax 500mg QD started based on G stain and pending CX  -continue with NC, humidified, pt de-sats without O2 to high 80's

## 2018-10-03 NOTE — PROGRESS NOTE ADULT - PROBLEM SELECTOR PLAN 5
SBP 120s  -continue amlodipine 10 mg QD     DVT PPX: Heparin SQ  DISPO: Pending further workup of syncope and monitoring on tele
SBP 110s-140s  -continue amlodipine 10 mg QD     DVT PPX: Heparin SQ  DISPO: Pending further workup of syncope and monitoring on tele
SBP 120s  -continue amlodipine 10 mg QD     DVT PPX: Heparin SQ  DISPO: Pending further workup of syncope and monitoring on tele, NPO after MN for NST tomorrow

## 2018-10-03 NOTE — PHYSICAL THERAPY INITIAL EVALUATION ADULT - MODALITIES TREATMENT COMMENTS
SP02 assessed while ambulating. Results as follows: Patient received on 2L NC 02, SP02 90-92%. Ambulated on RA x 50 feet, de-sat to 80% with increase to 84% in 30sec. Applied 2L, SP02 increased to 88%. Ambulated an additional 100 feet on 2L, de-sat to 84-85%, increased to 86-87% with standing rest break x30 sec. Increased to 91% with 4L NC 02 in ~15 seconds. Then ambulated 50 feet on 2L back to room on 2L, desat again to mid80's (85-86%) and increased to 93% on 2L with seated rest break. SP02 assessed while ambulating. Results as follows: Patient received on 2L NC 02, SP02 90-92%. Ambulated on RA x 50 feet, de-sat to 80% with increase to 84% in 30sec. Applied 2L, SP02 increased to 88%. Ambulated an additional 100 feet on 2L, de-sat to 84-85%, increased to 86-87% with standing rest break x30 sec. Increased to 91% with 4L NC 02 in ~15 seconds. Then ambulated 50 feet on 2L back to room, desat again to mid80's (85-86%) and increased to 93% on 2L with seated rest break.

## 2018-10-03 NOTE — PROGRESS NOTE ADULT - PROBLEM SELECTOR PLAN 1
continue to monitor on tele  - trops neg x 3  - ECHO 10/1: normal LV function, EF 60-65, Trace TR  - NST 10/2: normal   -US carotids B/L, f/u results  -EEG without seizure activity  -Neuro signing off, recommend outpt extended EEG  -F/u [facility]

## 2018-10-03 NOTE — PHYSICAL THERAPY INITIAL EVALUATION ADULT - PERTINENT HX OF CURRENT PROBLEM, REHAB EVAL
67yo F, former smoker (quit 1mo ago), with FHx MI (brother MI 62yo, father 41yo) and PMHx of HTN, HLD, hypothyroidism, anxiety and COPD who presented to [facility] ED on 10/1/18 complaining of cough and syncope x 4.

## 2018-10-03 NOTE — PROGRESS NOTE ADULT - PROBLEM SELECTOR PLAN 4
continue home atorvastatin 10 mg QD

## 2018-10-03 NOTE — PROGRESS NOTE ADULT - SUBJECTIVE AND OBJECTIVE BOX
NEUROLOGY CONSULT PROGRESS NOTE    INTERVAL HISTORY:      Pt seen and examined at bedside. NAEO. NAD. No respiratory distress. Laying comfortably in bed. Denies further episodes of syncope. No HA, changes in vision, eye pain, weakness, numbness, or tingling in the extremities, neck pain, CP, palpitations, SOB. No seizure-like activity since admission including convulsions, tongue biting, bowel/bladder incontinence. Pt otherwise endorsing feeling generally anxious with recent panic attack this past weekend which may have triggered her syncope. Otherwise, currently feeling physically well.     REVIEW OF SYSTEMS:  As per HPI, otherwise negative for Constitutional, Eyes, Ears/Nose/Mouth/Throat, Neck, Cardiovascular, Respiratory, Gastrointestinal, Genitourinary, Skin, Endocrine, Musculoskeletal, Psychiatric, and Hematologic/Lymphatic.    MEDICATIONS:  ALBUTerol/ipratropium for Nebulization 3 milliLiter(s) Nebulizer every 6 hours  ALPRAZolam 1 milliGRAM(s) Oral three times a day  amLODIPine   Tablet 10 milliGRAM(s) Oral daily  atorvastatin 10 milliGRAM(s) Oral at bedtime  azithromycin   Tablet 500 milliGRAM(s) Oral once  fluticasone propionate 50 MICROgram(s)/spray Nasal Spray 1 Spray(s) Both Nostrils two times a day  guaiFENesin  milliGRAM(s) Oral every 12 hours  heparin  Injectable 5000 Unit(s) SubCutaneous every 8 hours  influenza   Vaccine 0.5 milliLiter(s) IntraMuscular once  levothyroxine 150 MICROGram(s) Oral daily  melatonin 5 milliGRAM(s) Oral at bedtime  pantoprazole    Tablet 40 milliGRAM(s) Oral before breakfast  PARoxetine 30 milliGRAM(s) Oral daily    VITAL SIGNS:  Vital Signs Last 24 Hrs  T(C): 37.1 (03 Oct 2018 10:27), Max: 37.1 (03 Oct 2018 00:43)  T(F): 98.8 (03 Oct 2018 10:27), Max: 98.8 (03 Oct 2018 10:27)  HR: 83 (03 Oct 2018 12:12) (64 - 84)  BP: 130/71 (03 Oct 2018 12:12) (94/54 - 146/77)  BP(mean): 94 (03 Oct 2018 12:12) (94 - 104)  RR: 16 (03 Oct 2018 12:12) (16 - 19)  SpO2: 95% (03 Oct 2018 12:12) (93% - 96%)    PHYSICAL EXAMINATION:  Constitutional: WDWN; NAD  Eyes: R conjunctival hemorrhage, EOMI, PERRL, no nystagmus  Cardiovascular: +S1/S2, RRR  - Mental Status:  AAOx3; speech is fluent with intact naming, repetition, and comprehension  - Cranial Nerves II-XII:    II:  PERRLA; visual fields are full to confrontation  III, IV, VI:  EOMI, no nystagmus  V:  facial sensation is intact in the V1-V3 distribution bilaterally.  VII:  face is symmetric with normal eye closure and smile  VIII:  hearing is intact to finger rub  IX, X:  uvula is midline and soft palate rises symmetrically  XI:  head turning and shoulder shrug are intact bilaterally  XII:  tongue protrudes in the midline  - Motor:  strength is 5/5 throughout; normal muscle bulk and tone throughout; no pronator drift  - Reflexes:  2+ and symmetric at the biceps, triceps, brachioradialis, knees, and ankles;  plantar reflexes downgoing bilaterally  - Sensory:  intact to light touch, pin prick, vibration, and joint-position sense throughout  - Coordination:  finger-nose-finger and heel-knee-shin intact without dysmetria; rapid alternating hand movements intact  - Gait:  deferred    LABS:                          13.7   9.6   )-----------( 230      ( 03 Oct 2018 06:32 )             42.8     10-03    136  |  98  |  14  ----------------------------<  111<H>  4.2   |  27  |  0.64    Ca    9.5      03 Oct 2018 06:32  Mg     2.4     10-03            RADIOLOGY & ADDITIONAL STUDIES:    < from: Pharm Thallium Stress (Lexiscan) -LEXMIB (10.02.18 @ 15:56) >    Findings:  The resting heart rate was 68 bpm and the resting blood pressure was   90/60 mm Hg. A peak heart rate of 85 beats per minute and a blood   pressure of 90/52 mm Hg were recorded during stress. The patient did not   develop any symptoms other than fatigue during theThe resting EKG revealed normal sinus rhythm. Neither ST segment   depression nor cardiac arrhythmias were noted during the stress or   recovery periods.    The overall quality of the imaging is excellent. Visual analysis of the   rest and stress tomographic images demonstrates a physiologic   distribution of tracer throughout the myocardium. Quantitative analysis   does not demonstrate any significant areas of decreased tracer   concentration. The Total Perfusion Defect (TPD) is 0%. There is no   evidence of ischemic dilation of the left ventricle. The gated images   demonstrate normal left ventricular chamber size, wall motion and   myocardial thickening. The post-stress ejection fraction is 70% and the   rest ejection fraction is 71%. Additionally the right ventricle is normal.    EF (%)                      70                                    LLN = 50    EDV (ml)                  74                                    ULN =100    ESV (ml)                   23                                    ULN = 42                   TPD  Normal    <2%  Equivocal    2-4%  Mild      5-9%  Moderate    10-14%  Severe    >14%    Impression:    Myocardial perfusion imaging is normal. Overall left ventricular systolic   function is normal without regional wall motion abnormalities. The EKG   stress test is normal.    < end of copied text >    IMPRESSION & RECOMMENDATIONS:      68F, former smoker (quit 1mo ago), with FHx MI (brother MI 62yo, father 43yo) and PMHx of HTN, HLD, hypothyroidism, anxiety and COPD who presented to [facility] ED on 10/1/18 complaining of severe cough and syncope x 4.   -EEG negative for epileptiform   -exam w/o focal deficits and unchanged from prior  -negative orthostatics    Recommendations:  -history demonstrating symptoms consistent with syncope likely vasovagal 2/2 excessive stress/anxiety vs. heavy coughing spells   -no seizures on EEG but recommend extended EEG monitoring at home as an outpt (can call [phone] to set up)  -continue with syncopal w/u per primary team  -neurology to sign off at this time; please reconsult as needed NEUROLOGY CONSULT PROGRESS NOTE    INTERVAL HISTORY:      Pt seen and examined at bedside. NAEO. NAD. No respiratory distress. Laying comfortably in bed. Denies further episodes of syncope. No HA, changes in vision, eye pain, weakness, numbness, or tingling in the extremities, neck pain, CP, palpitations, SOB. No seizure-like activity since admission including convulsions, tongue biting, bowel/bladder incontinence. Pt otherwise endorsing feeling generally anxious with recent panic attack this past weekend which may have triggered her syncope. Otherwise, currently feeling physically well.     REVIEW OF SYSTEMS:  As per HPI, otherwise negative for Constitutional, Eyes, Ears/Nose/Mouth/Throat, Neck, Cardiovascular, Respiratory, Gastrointestinal, Genitourinary, Skin, Endocrine, Musculoskeletal, Psychiatric, and Hematologic/Lymphatic.    MEDICATIONS:  ALBUTerol/ipratropium for Nebulization 3 milliLiter(s) Nebulizer every 6 hours  ALPRAZolam 1 milliGRAM(s) Oral three times a day  amLODIPine   Tablet 10 milliGRAM(s) Oral daily  atorvastatin 10 milliGRAM(s) Oral at bedtime  azithromycin   Tablet 500 milliGRAM(s) Oral once  fluticasone propionate 50 MICROgram(s)/spray Nasal Spray 1 Spray(s) Both Nostrils two times a day  guaiFENesin  milliGRAM(s) Oral every 12 hours  heparin  Injectable 5000 Unit(s) SubCutaneous every 8 hours  influenza   Vaccine 0.5 milliLiter(s) IntraMuscular once  levothyroxine 150 MICROGram(s) Oral daily  melatonin 5 milliGRAM(s) Oral at bedtime  pantoprazole    Tablet 40 milliGRAM(s) Oral before breakfast  PARoxetine 30 milliGRAM(s) Oral daily    VITAL SIGNS:  Vital Signs Last 24 Hrs  T(C): 37.1 (03 Oct 2018 10:27), Max: 37.1 (03 Oct 2018 00:43)  T(F): 98.8 (03 Oct 2018 10:27), Max: 98.8 (03 Oct 2018 10:27)  HR: 83 (03 Oct 2018 12:12) (64 - 84)  BP: 130/71 (03 Oct 2018 12:12) (94/54 - 146/77)  BP(mean): 94 (03 Oct 2018 12:12) (94 - 104)  RR: 16 (03 Oct 2018 12:12) (16 - 19)  SpO2: 95% (03 Oct 2018 12:12) (93% - 96%)    PHYSICAL EXAMINATION:  Constitutional: WDWN; NAD  Eyes: R conjunctival hemorrhage, EOMI, PERRL, no nystagmus  Cardiovascular: +S1/S2, RRR  - Mental Status:  AAOx3; speech is fluent with intact naming, repetition, and comprehension  - Cranial Nerves II-XII:    II:  PERRLA; visual fields are full to confrontation  III, IV, VI:  EOMI, no nystagmus  V:  facial sensation is intact in the V1-V3 distribution bilaterally.  VII:  face is symmetric with normal eye closure and smile  VIII:  hearing is intact to finger rub  IX, X:  uvula is midline and soft palate rises symmetrically  XI:  head turning and shoulder shrug are intact bilaterally  XII:  tongue protrudes in the midline  - Motor:  strength is 5/5 throughout; normal muscle bulk and tone throughout; no pronator drift  - Reflexes:  2+ and symmetric at the biceps, triceps, brachioradialis, knees, and ankles;  plantar reflexes downgoing bilaterally  - Sensory:  intact to light touch, pin prick, vibration, and joint-position sense throughout  - Coordination:  finger-nose-finger and heel-knee-shin intact without dysmetria; rapid alternating hand movements intact  - Gait:  deferred    LABS:                          13.7   9.6   )-----------( 230      ( 03 Oct 2018 06:32 )             42.8     10-03    136  |  98  |  14  ----------------------------<  111<H>  4.2   |  27  |  0.64    Ca    9.5      03 Oct 2018 06:32  Mg     2.4     10-03            RADIOLOGY & ADDITIONAL STUDIES:    < from: Pharm Thallium Stress (Lexiscan) -LEXMIB (10.02.18 @ 15:56) >    Findings:  The resting heart rate was 68 bpm and the resting blood pressure was   90/60 mm Hg. A peak heart rate of 85 beats per minute and a blood   pressure of 90/52 mm Hg were recorded during stress. The patient did not   develop any symptoms other than fatigue during theThe resting EKG revealed normal sinus rhythm. Neither ST segment   depression nor cardiac arrhythmias were noted during the stress or   recovery periods.    The overall quality of the imaging is excellent. Visual analysis of the   rest and stress tomographic images demonstrates a physiologic   distribution of tracer throughout the myocardium. Quantitative analysis   does not demonstrate any significant areas of decreased tracer   concentration. The Total Perfusion Defect (TPD) is 0%. There is no   evidence of ischemic dilation of the left ventricle. The gated images   demonstrate normal left ventricular chamber size, wall motion and   myocardial thickening. The post-stress ejection fraction is 70% and the   rest ejection fraction is 71%. Additionally the right ventricle is normal.    EF (%)                      70                                    LLN = 50    EDV (ml)                  74                                    ULN =100    ESV (ml)                   23                                    ULN = 42                   TPD  Normal    <2%  Equivocal    2-4%  Mild      5-9%  Moderate    10-14%  Severe    >14%    Impression:    Myocardial perfusion imaging is normal. Overall left ventricular systolic   function is normal without regional wall motion abnormalities. The EKG   stress test is normal.    < end of copied text >    IMPRESSION & RECOMMENDATIONS:      68F, former smoker (quit 1mo ago), with FHx MI (brother MI 62yo, father 41yo) and PMHx of HTN, HLD, hypothyroidism, anxiety and COPD who presented to [facility] ED on 10/1/18 complaining of severe cough and syncope x 4.   -EEG negative for epileptiform   -exam w/o focal deficits and unchanged from prior  -negative orthostatics    Recommendations:  -history demonstrating symptoms consistent with syncope likely vasovagal 2/2 excessive stress/anxiety vs. heavy coughing spells   -no seizures on EEG but recommend extended EEG monitoring at home as an outpt (can call [phone] to set up)  -continue with syncopal w/u per primary team  -recommend Optho consult for R conjunctival hemorrhage   -neurology to sign off at this time; please reconsult as needed

## 2018-10-03 NOTE — PHYSICAL THERAPY INITIAL EVALUATION ADULT - CRITERIA FOR SKILLED THERAPEUTIC INTERVENTIONS
therapy frequency/anticipated equipment needs at discharge/impairments found/rehab potential/anticipated discharge recommendation

## 2018-10-03 NOTE — PHYSICAL THERAPY INITIAL EVALUATION ADULT - GENERAL OBSERVATIONS, REHAB EVAL
Patient received seated in bedside chair with + heplock IV, tele, 2L NC 02. Patient in no apparent distress.

## 2018-10-03 NOTE — PROGRESS NOTE ADULT - PROBLEM SELECTOR PLAN 3
Home dose of levothyroxine continued

## 2018-10-03 NOTE — PROGRESS NOTE ADULT - SUBJECTIVE AND OBJECTIVE BOX
Pt is improved   but feel  s dyspneic     PAST MEDICAL & SURGICAL HISTORY:  Depression  Hypothyroidism  Anxiety  Goiter  High cholesterol  HTN (hypertension)  S/P total knee replacement    MEDICATIONS  (STANDING):  ALBUTerol/ipratropium for Nebulization 3 milliLiter(s) Nebulizer every 6 hours  ALPRAZolam 1 milliGRAM(s) Oral three times a day  amLODIPine   Tablet 10 milliGRAM(s) Oral daily  artificial tears (preservative free) Ophthalmic Solution 1 Drop(s) Right EYE four times a day  atorvastatin 10 milliGRAM(s) Oral at bedtime  fluticasone propionate 50 MICROgram(s)/spray Nasal Spray 1 Spray(s) Both Nostrils two times a day  guaiFENesin  milliGRAM(s) Oral every 12 hours  heparin  Injectable 5000 Unit(s) SubCutaneous every 8 hours  influenza   Vaccine 0.5 milliLiter(s) IntraMuscular once  levothyroxine 150 MICROGram(s) Oral daily  melatonin 5 milliGRAM(s) Oral at bedtime  pantoprazole    Tablet 40 milliGRAM(s) Oral before breakfast  PARoxetine 30 milliGRAM(s) Oral daily    MEDICATIONS  (PRN):    ICU Vital Signs Last 24 Hrs  T(C): 36.6 (03 Oct 2018 14:16), Max: 37.1 (03 Oct 2018 00:43)  T(F): 97.8 (03 Oct 2018 14:16), Max: 98.8 (03 Oct 2018 10:27)  HR: 83 (03 Oct 2018 12:12) (64 - 84)  BP: 130/71 (03 Oct 2018 12:12) (94/54 - 146/77)  BP(mean): 94 (03 Oct 2018 12:12) (94 - 104)  ABP: --  ABP(mean): --  RR: 16 (03 Oct 2018 12:12) (16 - 19)  SpO2: 95% (03 Oct 2018 12:12) (93% - 96%)    lungs decreased breath sounds at bases  CV s1 s2  Abd soft  Ext stable                          13.7   9.6   )-----------( 230      ( 03 Oct 2018 06:32 )             42.8   10-03    136  |  98  |  14  ----------------------------<  111<H>  4.2   |  27  |  0.64    Ca    9.5      03 Oct 2018 06:32  Mg     2.4     10-03      Stress test   Normal Myocardial Perfusion and wall motion    Echo Normal EF  60-65 %     carotid doppler  no hemodynamically significant stenosis    CXR R base  atelectasis     CTA No PE

## 2018-10-03 NOTE — PROGRESS NOTE ADULT - SUBJECTIVE AND OBJECTIVE BOX
Interventional Cardiology PA Adult Progress Note    C.C.: Syncope on admission    Subjective Assessment:       12 Point ROS Negative except as per Subjective and HPI  	  MEDICATIONS:  amLODIPine   Tablet 10 milliGRAM(s) Oral daily      ALBUTerol/ipratropium for Nebulization 3 milliLiter(s) Nebulizer every 6 hours  guaiFENesin  milliGRAM(s) Oral every 12 hours    ALPRAZolam 1 milliGRAM(s) Oral three times a day  melatonin 5 milliGRAM(s) Oral at bedtime  PARoxetine 30 milliGRAM(s) Oral daily    pantoprazole    Tablet 40 milliGRAM(s) Oral before breakfast    atorvastatin 10 milliGRAM(s) Oral at bedtime  levothyroxine 150 MICROGram(s) Oral daily    artificial tears (preservative free) Ophthalmic Solution 1 Drop(s) Right EYE four times a day  fluticasone propionate 50 MICROgram(s)/spray Nasal Spray 1 Spray(s) Both Nostrils two times a day  heparin  Injectable 5000 Unit(s) SubCutaneous every 8 hours  influenza   Vaccine 0.5 milliLiter(s) IntraMuscular once      	    [PHYSICAL EXAM:  TELEMETRY:  T(C): 36.6 (10-03-18 @ 14:16), Max: 37.1 (10-03-18 @ 00:43)  HR: 83 (10-03-18 @ 12:12) (64 - 84)  BP: 130/71 (10-03-18 @ 12:12) (94/54 - 146/77)  RR: 16 (10-03-18 @ 12:12) (16 - 19)  SpO2: 95% (10-03-18 @ 12:12) (93% - 96%)  Wt(kg): --  I&O's Summary    03 Oct 2018 07:01  -  03 Oct 2018 16:24  --------------------------------------------------------  IN: 900 mL / OUT: 0 mL / NET: 900 mL        Clemente:  Central/PICC/Mid Line:                                         Appearance: Normal	  HEENT:   Normal oral mucosa, PERRL, EOMI	  Neck: Supple, + JVD/ - JVD; Carotid Bruit   Cardiovascular: Normal S1 S2, No JVD, No murmurs,   Respiratory: Lungs clear to auscultation/Decreased Breath Sounds/No Rales, Rhonchi, Wheezing	  Gastrointestinal:  Soft, Non-tender, + BS	  Skin: No rashes, No ecchymoses, No cyanosis  Extremities: Normal range of motion, No clubbing, cyanosis or edema  Vascular: Peripheral pulses palpable 2+ bilaterally  Neurologic: Non-focal  Psychiatry: A & O x 3, Mood & affect appropriate      	    ECG:  	  RADIOLOGY:   DIAGNOSTIC TESTING:  [ ] Echocardiogram:  [ ]  Catheterization:  [ ] Stress Test:    [ ] CARMEN  OTHER: 	    LABS:	 	  CARDIAC MARKERS:                                  13.7   9.6   )-----------( 230      ( 03 Oct 2018 06:32 )             42.8     10-03    136  |  98  |  14  ----------------------------<  111<H>  4.2   |  27  |  0.64    Ca    9.5      03 Oct 2018 06:32  Mg     2.4     10-03      proBNP:   Lipid Profile:   HgA1c:   TSH:       ASSESSMENT/PLAN: 	        DVT ppx:  Dispo: Interventional Cardiology PA Adult Progress Note    C.C.: Syncope on admission    Subjective Assessment: Pt seen and examined at bedside this AM. Patient states she feels better on 2LNC humidified O2, intermittent productive cough is also less frequent today.      12 Point ROS Negative except as per Subjective and HPI  	  MEDICATIONS:  amLODIPine   Tablet 10 milliGRAM(s) Oral daily  ALBUTerol/ipratropium for Nebulization 3 milliLiter(s) Nebulizer every 6 hours  guaiFENesin  milliGRAM(s) Oral every 12 hours  ALPRAZolam 1 milliGRAM(s) Oral three times a day  melatonin 5 milliGRAM(s) Oral at bedtime  PARoxetine 30 milliGRAM(s) Oral daily  pantoprazole    Tablet 40 milliGRAM(s) Oral before breakfast  atorvastatin 10 milliGRAM(s) Oral at bedtime  levothyroxine 150 MICROGram(s) Oral daily  artificial tears (preservative free) Ophthalmic Solution 1 Drop(s) Right EYE four times a day  fluticasone propionate 50 MICROgram(s)/spray Nasal Spray 1 Spray(s) Both Nostrils two times a day  heparin  Injectable 5000 Unit(s) SubCutaneous every 8 hours  influenza   Vaccine 0.5 milliLiter(s) IntraMuscular once      PHYSICAL EXAM:  TELEMETRY:  T(C): 36.6 (10-03-18 @ 14:16), Max: 37.1 (10-03-18 @ 00:43)  HR: 83 (10-03-18 @ 12:12) (64 - 84)  BP: 130/71 (10-03-18 @ 12:12) (94/54 - 146/77)  RR: 16 (10-03-18 @ 12:12) (16 - 19)  SpO2: 95% (10-03-18 @ 12:12) (93% - 96%)  Wt(kg): --  I&O's Summary    03 Oct 2018 07:01  -  03 Oct 2018 16:24  --------------------------------------------------------  IN: 900 mL / OUT: 0 mL / NET: 900 mL      Appearance: NAD, bruising and lacerations on face	  HEENT: Normal oral mucosa, PERRL, EOMI, R subconjunctival hemorrhage 	  Neck: Supple,  - JVD; No Carotid Bruit   Cardiovascular: Normal S1 S2, No JVD, No murmurs,   Respiratory: Diffuse wheezing bilaterally	  Gastrointestinal:  Soft, Non-tender, + BS	  Skin: No rashes, No ecchymoses, No cyanosis  Extremities: Normal range of motion, No clubbing, cyanosis or edema  Vascular: Peripheral pulses palpable 2+ bilaterally  Neurologic: Non-focal  Psychiatry: A & O x 3, Mood & affect appropriate  	                          13.7   9.6   )-----------( 230      ( 03 Oct 2018 06:32 )             42.8     10-03    136  |  98  |  14  ----------------------------<  111<H>  4.2   |  27  |  0.64    Ca    9.5      03 Oct 2018 06:32  Mg     2.4     10-03

## 2018-10-03 NOTE — PHYSICAL THERAPY INITIAL EVALUATION ADULT - ADDITIONAL COMMENTS
Patient reports that she lives in an elevator building with her cat. Ramp to enter, no stairs. has a cane, rolling walker, crutches, and knee brace at home from a prior TKA, however does not use these devices anymore.

## 2018-10-04 ENCOUNTER — TRANSCRIPTION ENCOUNTER (OUTPATIENT)
Age: 68
End: 2018-10-04

## 2018-10-04 VITALS — TEMPERATURE: 97 F

## 2018-10-04 LAB
ANION GAP SERPL CALC-SCNC: 10 MMOL/L — SIGNIFICANT CHANGE UP (ref 5–17)
BASOPHILS NFR BLD AUTO: 0.4 % — SIGNIFICANT CHANGE UP (ref 0–2)
BUN SERPL-MCNC: 12 MG/DL — SIGNIFICANT CHANGE UP (ref 7–23)
CALCIUM SERPL-MCNC: 9.6 MG/DL — SIGNIFICANT CHANGE UP (ref 8.4–10.5)
CHLORIDE SERPL-SCNC: 100 MMOL/L — SIGNIFICANT CHANGE UP (ref 96–108)
CO2 SERPL-SCNC: 30 MMOL/L — SIGNIFICANT CHANGE UP (ref 22–31)
CREAT SERPL-MCNC: 0.64 MG/DL — SIGNIFICANT CHANGE UP (ref 0.5–1.3)
CULTURE RESULTS: SIGNIFICANT CHANGE UP
EOSINOPHIL NFR BLD AUTO: 10.2 % — HIGH (ref 0–6)
GLUCOSE SERPL-MCNC: 111 MG/DL — HIGH (ref 70–99)
HCT VFR BLD CALC: 40.6 % — SIGNIFICANT CHANGE UP (ref 34.5–45)
HGB BLD-MCNC: 13.3 G/DL — SIGNIFICANT CHANGE UP (ref 11.5–15.5)
LYMPHOCYTES # BLD AUTO: 30.6 % — SIGNIFICANT CHANGE UP (ref 13–44)
MAGNESIUM SERPL-MCNC: 2.5 MG/DL — SIGNIFICANT CHANGE UP (ref 1.6–2.6)
MCHC RBC-ENTMCNC: 32.8 G/DL — SIGNIFICANT CHANGE UP (ref 32–36)
MCHC RBC-ENTMCNC: 34.1 PG — HIGH (ref 27–34)
MCV RBC AUTO: 104.1 FL — HIGH (ref 80–100)
MONOCYTES NFR BLD AUTO: 9.1 % — SIGNIFICANT CHANGE UP (ref 2–14)
NEUTROPHILS NFR BLD AUTO: 49.7 % — SIGNIFICANT CHANGE UP (ref 43–77)
PLATELET # BLD AUTO: 215 K/UL — SIGNIFICANT CHANGE UP (ref 150–400)
POTASSIUM SERPL-MCNC: 4.2 MMOL/L — SIGNIFICANT CHANGE UP (ref 3.5–5.3)
POTASSIUM SERPL-SCNC: 4.2 MMOL/L — SIGNIFICANT CHANGE UP (ref 3.5–5.3)
RBC # BLD: 3.9 M/UL — SIGNIFICANT CHANGE UP (ref 3.8–5.2)
RBC # FLD: 13.2 % — SIGNIFICANT CHANGE UP (ref 10.3–16.9)
SODIUM SERPL-SCNC: 140 MMOL/L — SIGNIFICANT CHANGE UP (ref 135–145)
SPECIMEN SOURCE: SIGNIFICANT CHANGE UP
WBC # BLD: 8.1 K/UL — SIGNIFICANT CHANGE UP (ref 3.8–10.5)
WBC # FLD AUTO: 8.1 K/UL — SIGNIFICANT CHANGE UP (ref 3.8–10.5)

## 2018-10-04 PROCEDURE — 99232 SBSQ HOSP IP/OBS MODERATE 35: CPT

## 2018-10-04 RX ORDER — FLUTICASONE PROPIONATE 50 MCG
1 SPRAY, SUSPENSION NASAL
Qty: 1 | Refills: 3 | OUTPATIENT
Start: 2018-10-04 | End: 2019-01-31

## 2018-10-04 RX ORDER — PANTOPRAZOLE SODIUM 20 MG/1
1 TABLET, DELAYED RELEASE ORAL
Qty: 30 | Refills: 3 | OUTPATIENT
Start: 2018-10-04 | End: 2019-01-31

## 2018-10-04 RX ORDER — AZITHROMYCIN 500 MG/1
1 TABLET, FILM COATED ORAL
Qty: 5 | Refills: 0 | OUTPATIENT
Start: 2018-10-04 | End: 2018-10-08

## 2018-10-04 RX ADMIN — Medication 5 MILLIGRAM(S): at 00:30

## 2018-10-04 RX ADMIN — AZITHROMYCIN 500 MILLIGRAM(S): 500 TABLET, FILM COATED ORAL at 12:23

## 2018-10-04 RX ADMIN — HEPARIN SODIUM 5000 UNIT(S): 5000 INJECTION INTRAVENOUS; SUBCUTANEOUS at 05:47

## 2018-10-04 RX ADMIN — AMLODIPINE BESYLATE 10 MILLIGRAM(S): 2.5 TABLET ORAL at 05:47

## 2018-10-04 RX ADMIN — Medication 30 MILLIGRAM(S): at 12:23

## 2018-10-04 RX ADMIN — Medication 1 SPRAY(S): at 05:46

## 2018-10-04 RX ADMIN — Medication 3 MILLILITER(S): at 00:30

## 2018-10-04 RX ADMIN — Medication 1 MILLIGRAM(S): at 07:23

## 2018-10-04 RX ADMIN — Medication 600 MILLIGRAM(S): at 05:46

## 2018-10-04 RX ADMIN — PANTOPRAZOLE SODIUM 40 MILLIGRAM(S): 20 TABLET, DELAYED RELEASE ORAL at 05:48

## 2018-10-04 RX ADMIN — Medication 1 DROP(S): at 05:46

## 2018-10-04 RX ADMIN — Medication 3 MILLILITER(S): at 05:47

## 2018-10-04 RX ADMIN — Medication 150 MICROGRAM(S): at 05:47

## 2018-10-04 RX ADMIN — Medication 3 MILLILITER(S): at 12:23

## 2018-10-04 NOTE — PROGRESS NOTE ADULT - SUBJECTIVE AND OBJECTIVE BOX
Pt is stable      PAST MEDICAL & SURGICAL HISTORY:  Depression  Hypothyroidism  Anxiety  Goiter  High cholesterol  HTN (hypertension)  S/P total knee replacement    MEDICATIONS  (STANDING):  ALBUTerol/ipratropium for Nebulization 3 milliLiter(s) Nebulizer every 6 hours  ALPRAZolam 1 milliGRAM(s) Oral three times a day  amLODIPine   Tablet 10 milliGRAM(s) Oral daily  artificial tears (preservative free) Ophthalmic Solution 1 Drop(s) Right EYE four times a day  atorvastatin 10 milliGRAM(s) Oral at bedtime  azithromycin   Tablet 500 milliGRAM(s) Oral daily  fluticasone propionate 50 MICROgram(s)/spray Nasal Spray 1 Spray(s) Both Nostrils two times a day  guaiFENesin  milliGRAM(s) Oral every 12 hours  heparin  Injectable 5000 Unit(s) SubCutaneous every 8 hours  influenza   Vaccine 0.5 milliLiter(s) IntraMuscular once  levothyroxine 150 MICROGram(s) Oral daily  melatonin 5 milliGRAM(s) Oral at bedtime  pantoprazole    Tablet 40 milliGRAM(s) Oral before breakfast  PARoxetine 30 milliGRAM(s) Oral daily    MEDICATIONS  (PRN):    ICU Vital Signs Last 24 Hrs  T(C): 36.7 (04 Oct 2018 08:45), Max: 37.4 (03 Oct 2018 22:24)  T(F): 98.1 (04 Oct 2018 08:45), Max: 99.4 (03 Oct 2018 22:24)  HR: 79 (04 Oct 2018 08:45) (76 - 83)  BP: 98/54 (04 Oct 2018 08:45) (98/54 - 130/71)  BP(mean): 71 (04 Oct 2018 08:45) (71 - 94)  ABP: --  ABP(mean): --  RR: 16 (04 Oct 2018 08:45) (16 - 16)  SpO2: 100% (04 Oct 2018 08:45) (94% - 100%)    lung s decreased breath sounds at bases  cv s1 s2  Abd soft  Ext stable                          13.3   8.1   )-----------( 215      ( 04 Oct 2018 07:48 )             40.6   10-04    140  |  100  |  12  ----------------------------<  111<H>  4.2   |  30  |  0.64    Ca    9.6      04 Oct 2018 07:48  Mg     2.5     10-04    Stress test normal

## 2018-10-04 NOTE — PROGRESS NOTE ADULT - ASSESSMENT
ASSESSMENT/[id number] F, former smoker (quit 1mo ago), with FHx MI (brother MI 64yo, father 41yo) and PMHx of HTN, HLD, hypothyroidism, anxiety and COPD who presented to [facility] ED on 10/1/18 complaining of severe cough, syncope. Severe and chronic tracheobronchitis c mucous impaction.    1. O2 2LNC humidify  2. Bronchodilators:  Atrovent/ albuterol q 4 – 6 hours as needed  3. Corticosteroids: off  4. ID/Antibiotics: off  5. Cardiac/HTN: Monitor, syncope cardiac work up  6. GI: Rx/ prophylaxis c PPI/H2B  7. Heme: Rx/VT prophylaxis c SQH/SCD  8. Aspiration precautions  9. Obtain sputum CX and RVP  10. Do not attempt PFTS at this time  11. Do not recommend Tessalon at this time  Discussed with managing team
S/P ETOH  COPD    Cig use   cardiac workup neg   Cont   RX    Pulm follow up
67yo F, former smoker (quit 1mo ago), with FHx MI (brother MI 64yo, father 41yo) and PMHx of HTN, HLD, hypothyroidism, anxiety and COPD who presented to [facility] ED on 10/1/18 complaining of cough and syncope x 4.
ASSESSMENT/[id number] F, former smoker (quit 1mo ago), with FHx MI (brother MI 62yo, father 43yo) and PMHx of HTN, HLD, hypothyroidism, anxiety and COPD who presented to [facility] ED on 10/1/18 complaining of severe cough, syncope. Severe and chronic tracheobronchitis c mucous impaction.    1. O2 attempt off   2. Bronchodilators:  Atrovent/ albuterol q 4 – 6 hours as needed  3. Corticosteroids: off  4. ID/Antibiotics: Zithromax continue to 5 days  5. Cardiac/HTN: Monitor, syncope cardiac work up  6. GI: Rx/ prophylaxis c PPI/H2B  7. Heme: Rx/VT prophylaxis c SQH/SCD  8. Aspiration precautions  9. May use Mucinex  10. Do not attempt PFTS at this time  11. Do not recommend Tessalon at this time  Discussed with managing team, the patient would follow with her usual doctors including Pulmonologist at [facility], albeit our follow up as an option was offered and secured.
ASSESSMENT/[id number] F, former smoker (quit 1mo ago), with FHx MI (brother MI 62yo, father 43yo) and PMHx of HTN, HLD, hypothyroidism, anxiety and COPD who presented to [facility] ED on 10/1/18 complaining of severe cough, syncope. Severe and chronic tracheobronchitis c mucous impaction.    1. O2 2LNC humidify  2. Bronchodilators:  Atrovent/ albuterol q 4 – 6 hours as needed  3. Corticosteroids: off  4. ID/Antibiotics: Zithromax started based on G stain and pending CX  5. Cardiac/HTN: Monitor, syncope cardiac work up  6. GI: Rx/ prophylaxis c PPI/H2B  7. Heme: Rx/VT prophylaxis c SQH/SCD  8. Aspiration precautions  9. May use Mucinex  10. Do not attempt PFTS at this time  11. Do not recommend Tessalon at this time  Discussed with managing team 
COPD  Hx ETOH       cont med management     for discharge plan
Neuro follow up  EEG  Carotid doppler     re Syncope      ETOH Use   observe   COPD s/p Pulm consult     cont Rx
Seizure disorder being ruled out     Stress test after EEG
67yo F, former smoker (quit 1mo ago), with FHx MI (brother MI 64yo, father 41yo) and PMHx of HTN, HLD, hypothyroidism, anxiety and COPD who presented to [facility] ED on 10/1/18 complaining of cough and syncope x 4.
69yo F, former smoker (quit 1mo ago), with FHx MI (brother MI 64yo, father 41yo) and PMHx of HTN, HLD, hypothyroidism, anxiety and COPD who presented to [facility] ED on 10/1/18 complaining of cough and syncope x 4.

## 2018-10-04 NOTE — DISCHARGE NOTE ADULT - PLAN OF CARE
Please make sure to follow up with Dr. Goldberg and Dr. Cardenas within 1-2 weeks of discharge. You came in to the hospital because of some episodes of fainting. While you were here, your blood work showed no signs of heart attack, Echocardiogram was normal, Stress Test was normal, Orthostatic blood pressure testing was normal, Carotid Artery ultrasound was normal, and EEG did not show any signs of seizure activity. You should follow up with Dr. Goldberg and Dr. Cardenas within 1-2 weeks of discharge. Please return to the emergency room with any recurrences. You have an infection of your upper airways. A prescription was sent to your pharmacy for a Z-pack (antibiotic) for which you should follow the directions of the 5 day course and taper. You should also continue using Mucinex and Flonase as prescribed. Please make sure to follow up with Dr. Cardenas within 1-2 weeks or sooner should you not be improving. You have a history of COPD. You should follow up with your pulmonologist within 1-2 weeks. Please continue your Ventolin as prescribed. Please continue medications as listed to keep your blood pressure controlled. For blood pressure that is too high or too low please see your doctor or go to the emergency room as necessary. Please continue Atorvastatin 10mg to keep your cholesterol low. High cholesterol contributes to heart disease. Please continue your Levothyroxine as prescribed. You came in to the hospital because of some episodes of fainting. While you were here, your blood work showed no signs of heart attack, Echocardiogram was normal, Stress Test was normal, Orthostatic blood pressure testing was normal, Carotid Artery ultrasound was normal, and EEG did not show any signs of seizure activity. You should follow up with Dr. Goldberg and Dr. Cardenas within 1-2 weeks of discharge. Please return to the emergency room with any recurrences. Neurology was consulted and they felt your fainting was likely due to stress and coughing spells. They recommend long term EEG monitoring at home as an out patient for which you can call [phone] to set up.

## 2018-10-04 NOTE — DISCHARGE NOTE ADULT - CARE PLAN
Principal Discharge DX:	Syncope and collapse  Goal:	Please make sure to follow up with Dr. Goldberg and Dr. Cardenas within 1-2 weeks of discharge.  Assessment and plan of treatment:	You came in to the hospital because of some episodes of fainting. While you were here, your blood work showed no signs of heart attack, Echocardiogram was normal, Stress Test was normal, Orthostatic blood pressure testing was normal, Carotid Artery ultrasound was normal, and EEG did not show any signs of seizure activity. You should follow up with Dr. Goldberg and Dr. Cardenas within 1-2 weeks of discharge. Please return to the emergency room with any recurrences.  Secondary Diagnosis:	Tracheobronchitis  Assessment and plan of treatment:	You have an infection of your upper airways. A prescription was sent to your pharmacy for a Z-pack (antibiotic) for which you should follow the directions of the 5 day course and taper. You should also continue using Mucinex and Flonase as prescribed. Please make sure to follow up with Dr. Cardenas within 1-2 weeks or sooner should you not be improving.  Secondary Diagnosis:	COPD (chronic obstructive pulmonary disease)  Assessment and plan of treatment:	You have a history of COPD. You should follow up with your pulmonologist within 1-2 weeks. Please continue your Ventolin as prescribed.  Secondary Diagnosis:	HTN (hypertension)  Assessment and plan of treatment:	Please continue medications as listed to keep your blood pressure controlled. For blood pressure that is too high or too low please see your doctor or go to the emergency room as necessary.  Secondary Diagnosis:	High cholesterol  Assessment and plan of treatment:	Please continue Atorvastatin 10mg to keep your cholesterol low. High cholesterol contributes to heart disease.  Secondary Diagnosis:	Hypothyroidism  Assessment and plan of treatment:	Please continue your Levothyroxine as prescribed. Principal Discharge DX:	Syncope and collapse  Goal:	Please make sure to follow up with Dr. Goldberg and Dr. Cardenas within 1-2 weeks of discharge.  Assessment and plan of treatment:	You came in to the hospital because of some episodes of fainting. While you were here, your blood work showed no signs of heart attack, Echocardiogram was normal, Stress Test was normal, Orthostatic blood pressure testing was normal, Carotid Artery ultrasound was normal, and EEG did not show any signs of seizure activity. You should follow up with Dr. Goldberg and Dr. Cardenas within 1-2 weeks of discharge. Please return to the emergency room with any recurrences. Neurology was consulted and they felt your fainting was likely due to stress and coughing spells. They recommend long term EEG monitoring at home as an out patient for which you can call [phone] to set up.  Secondary Diagnosis:	Tracheobronchitis  Assessment and plan of treatment:	You have an infection of your upper airways. A prescription was sent to your pharmacy for a Z-pack (antibiotic) for which you should follow the directions of the 5 day course and taper. You should also continue using Mucinex and Flonase as prescribed. Please make sure to follow up with Dr. Cardenas within 1-2 weeks or sooner should you not be improving.  Secondary Diagnosis:	COPD (chronic obstructive pulmonary disease)  Assessment and plan of treatment:	You have a history of COPD. You should follow up with your pulmonologist within 1-2 weeks. Please continue your Ventolin as prescribed.  Secondary Diagnosis:	HTN (hypertension)  Assessment and plan of treatment:	Please continue medications as listed to keep your blood pressure controlled. For blood pressure that is too high or too low please see your doctor or go to the emergency room as necessary.  Secondary Diagnosis:	High cholesterol  Assessment and plan of treatment:	Please continue Atorvastatin 10mg to keep your cholesterol low. High cholesterol contributes to heart disease.  Secondary Diagnosis:	Hypothyroidism  Assessment and plan of treatment:	Please continue your Levothyroxine as prescribed.

## 2018-10-04 NOTE — DISCHARGE NOTE ADULT - MEDICATION SUMMARY - MEDICATIONS TO TAKE
I will START or STAY ON the medications listed below when I get home from the hospital:    PARoxetine 30 mg oral tablet  -- 1 tab(s) by mouth once a day  -- Indication: For As indicated    atorvastatin 10 mg oral tablet  -- 1 tab(s) by mouth once a day  -- Indication: For Cholesterol    ALPRAZOLAM   TAB 1MG  -- Indication: For Anxiety    Ventolin HFA  -- 2 puff(s) inhaled every 4 to 8 hours, As Needed  -- Indication: For Breathing    amLODIPine 10 mg oral tablet  -- 1 tab(s) by mouth once a day  -- Indication: For Cholesterol    guaiFENesin 600 mg oral tablet, extended release  -- 1 tab(s) by mouth every 12 hours  -- Indication: For Cough    Azithromycin 5 Day Dose Pack 250 mg oral tablet  -- Please take medications exactly as explained on packaging  -- Do not take dairy products, antacids, or iron preparations within one hour of this medication.  Finish all this medication unless otherwise directed by prescriber.    -- Indication: For Antibiotic    fluticasone 50 mcg/inh nasal spray  -- 1 spray(s) into nose 2 times a day  -- Indication: For Nasal Spray    ocular lubricant ophthalmic solution  -- 1 drop(s) to each affected eye 4 times a day  -- Indication: For Right Eye Irritation    pantoprazole 40 mg oral delayed release tablet  -- 1 tab(s) by mouth once a day (before a meal)  -- Indication: For Acid Reflux    LEVOTHYROXIN TAB 150MCG  -- Indication: For Hypothyroidism

## 2018-10-04 NOTE — DISCHARGE NOTE ADULT - PROVIDER TOKENS
FREE:[LAST:[Ronald],FIRST:[Soo],PHONE:[phone]],FAX:[(   )    -],ADDRESS:[[address] [place] [address]]],FREE:[LAST:[Goldberg],FIRST:[Renée],PHONE:[phone]],FAX:[(   )    -],ADDRESS:[[address] [place] [id number]]]

## 2018-10-04 NOTE — PROGRESS NOTE ADULT - SUBJECTIVE AND OBJECTIVE BOX
Interventional, Pulmonary, Critical, Chest Special Procedures.    Pt was seen and fully examined by myself.     Time spent with patient in minutes:37    Patient is a 68y old  Female who presents with a chief complaint of syncope (04 Oct 2018 13:03) The patient reports feeling better today, cough is less today.  The patient is eupneic now on RA.    HPI:  69yo F, former smoker (quit 1mo ago), with FHx MI (brother MI 62yo, father 43yo) and PMHx of HTN, HLD, hypothyroidism, anxiety and COPD who presented to [facility] ED on 10/1/18 complaining of cough and syncope x 4. Patient states first episode was when she was getting an outpatient CT scan at [facility] last Wed 9/26. She was walking along and suddenly woke up on the floor. She declined to be brought to the ED there at that time. Her next episode occurred while sitting on the couch when she suddenly awoke on the floor. Patient states she does not think she was out for more than a few seconds because the same comercial was still on TV. Yesterday she syncopized at the dinner table and her face went into her meal. Most recently today, she went for a walk and lost consciousness and awoke on the street. All incidents were preceded by lightheadedness and included head trauma. Patient has also been complaining of productive cough with white sputum x 3 weeks for which she has been followed by her PCP, pulm and ENT. She had CT scan of her sinuses that only revealed deviated septum and CT chest which revealed improvement in prior ?nodules. Patient states cough has been somewhat improving. Patient denies CP, palpitations, PND, orthopnea, N/V, hematochezia, melena, LE edema, tongue biting, evacuation of bladder/bowels, involuntary movement or seizure activity. She was scheduled for an outpatient stress test today and states she thinks she recently had an echocardiogram. Vitals on arrival to the ED were Temp 99.6F, HR 75bpm, /75, RR 18, O2 sat 96% RA. Labs significant for trops neg x 2. EKG revealed SR @ 70bpm with TWI in AVL, V2 and V3. CXR revealed small bandlike, patchy opacity across right lung base, likely representing atelectatic change. CT head revealed soft tissue injury of the left frontal scalp, no acute fracture, no intracranial hemorrhage. CTA ruled out PE and consolidation. In the ED, patient was given nebs, dTAP vaccine, solumedrol 125mg IV x 1, 1L NS. Patient is now admitted to [facility] for further workup of syncope. (01 Oct 2018 01:57)    REVIEW OF SYSTEMS:  Constitutional: No fever, weight loss, chills + fatigue  Eyes: No eye pain, visual disturbances, or discharge  ENMT:  No difficulty hearing, tinnitus, vertigo; No sinus or throat pain. No epistaxis, dysphagia, dysphonia, hoarseness or odynophagia  Neck: No pain, stiffness or neck swelling.  No masses or deformities  Respiratory: Less  cough, no wheezing, chills or hemoptysis  +COPD  - ILD   - PE   - ASTHMA     - PNEUMONIA  Cardiovascular: No chest pain, dysrhythmia, palpitations, dizziness or edema   - COPD     = CAD   - CHF   + HTN  Gastrointestinal: No abdominal or epigastric pain. No nausea, vomiting or hematemesis; No diarrhea or constipation. No melena or hematochezia. No dysphagia or Icterus.          Genitourinary: No dysuria, frequency, hematuria or incontinence   - CKD/CHAITANYA      - ESRD  Neurological: No headaches, memory loss, loss of strength, numbness or tremors      -DEMENTIA     - STROKE    - SEIZURE  Skin: No itching, burning, rashes or lesions   Lymph Nodes: No enlarged glands  Endocrine: No heat or cold intolerance; No hair loss       - DM     - THYROID DISORDER  Musculoskeletal: No joint pain or swelling; No muscle, back or extremity pain  Psychiatric: No depression, anxiety, mood swings or difficulty sleeping  Heme/Lymph: No easy bruising or bleeding gums         - ANEMIA      - CANCER   -COAGULOPATHY  Allergy and Immunologic: No hives or eczema    PAST MEDICAL & SURGICAL HISTORY:  Depression  Hypothyroidism  Anxiety  Goiter  High cholesterol  HTN (hypertension)  S/P total knee replacement    FAMILY HISTORY:  Family history of heart disease (Father)    SOCIAL HISTORY:      - Tobacco     - ETOH    Allergies    losartan (Angioedema)    Intolerances      Vital Signs Last 24 Hrs  T(C): 36.2 (04 Oct 2018 14:06), Max: 37.4 (03 Oct 2018 22:24)  T(F): 97.1 (04 Oct 2018 14:06), Max: 99.4 (03 Oct 2018 22:24)  HR: 77 (04 Oct 2018 12:21) (76 - 79)  BP: 112/65 (04 Oct 2018 12:21) (98/54 - 124/67)  BP(mean): 84 (04 Oct 2018 12:21) (71 - 85)  RR: 16 (04 Oct 2018 12:21) (16 - 16)  SpO2: 95% (04 Oct 2018 12:21) (94% - 100%)    10-03 @ 07:01  -  10-04 @ 07:00  --------------------------------------------------------  IN: 1080 mL / OUT: 0 mL / NET: 1080 mL    10-04 @ 07:01  -  10-04 @ 15:02  --------------------------------------------------------  IN: 180 mL / OUT: 0 mL / NET: 180 mL        PHYSICAL EXAM:  More Comfortable, no immediate distress  Eyes: PERRL, EOM intact; conjunctiva injected, and sclera clear  Head: Normocephalic; + frontal ecchymoses Trauma  ENMT: No nasal discharge, less hoarseness, cough   Neck: Supple; non tender; no masses or deformities.    No JVD  Respiratory: - WHEEZING   + few scattered  RHONCHI  - RALES  + CRACKLES.  Diminished breath sounds  BILATERAL  RIGHT  LEFT bases today  Cardiovascular: Regular rate and rhythm. S1 and S2 Normal; No murmurs, gallops or rubs     - PPM/AICD  Gastrointestinal: Soft non-tender, non-distended; Normal bowel sounds; No hepatosplenomegaly.     -PEG    -  GT   - STACY  Genitourinary: No costovertebral angle tenderness. No dysuria  Extremities: AROM, No clubbing, cyanosis or edema    Vascular: Peripheral pulses palpable 2+ bilaterally  Neurological: Alert and responisve to stimuli   Skin: Warm and dry. No obvious rash  Lymph Nodes: No acute cervical or supraclavicular adenopathy  Psychiatric: Cooperative and anxious    DEVICES:  - DENTURES   +IV R / L     - ETUBE   -TRACH   -CTUBE  R / L      LABS:                          13.3   8.1   )-----------( 215      ( 04 Oct 2018 07:48 )             40.6     10-04    140  |  100  |  12  ----------------------------<  111<H>  4.2   |  30  |  0.64    Ca    9.6      04 Oct 2018 07:48  Mg     2.5     10-04        RADIOLOGY & ADDITIONAL STUDIES (The following images were personally reviewed):

## 2018-10-04 NOTE — DISCHARGE NOTE ADULT - SECONDARY DIAGNOSIS.
Tracheobronchitis COPD (chronic obstructive pulmonary disease) HTN (hypertension) High cholesterol Hypothyroidism

## 2018-10-04 NOTE — DISCHARGE NOTE ADULT - INSTRUCTIONS
Please make sure to follow a low sodium, low fat, low carbohydrate diet and stay adequately hydrated at all times.

## 2018-10-04 NOTE — DISCHARGE NOTE ADULT - CARE PROVIDER_API CALL
Soo Cardenas  [address] [address]  Phone: [phone]  Fax: (   )    -    Goldberg, Neica  [address] [address]  Phone: [phone]  Fax: (   )    -

## 2018-10-04 NOTE — DISCHARGE NOTE ADULT - PATIENT PORTAL LINK FT
You can access the [facility] Patient Portal, offered by [facility], by registering with the following website: [email or web address] [place]

## 2018-10-04 NOTE — DISCHARGE NOTE ADULT - MEDICATION SUMMARY - MEDICATIONS TO STOP TAKING
I will STOP taking the medications listed below when I get home from the hospital:    predniSONE 20 mg oral tablet  -- 2 tab(s) by mouth once a day    famotidine 20 mg oral tablet  -- 1 tab(s) by mouth once a day

## 2018-10-04 NOTE — DISCHARGE NOTE ADULT - HOSPITAL COURSE
67yo F, former smoker (quit 1mo ago), with FHx MI (brother MI 64yo, father 43yo) and PMHx of HTN, HLD, hypothyroidism, anxiety and COPD who presented to [facility] ED on 10/1/18 complaining of cough and syncope x 4. Patient states first episode was when she was getting an outpatient CT scan at [facility] last Wed 9/26. She was walking along and suddenly woke up on the floor. She declined to be brought to the ED there at that time. Her next episode occurred while sitting on the couch when she suddenly awoke on the floor. Patient states she does not think she was out for more than a few seconds because the same comercial was still on TV. Yesterday she synopsized at the dinner table and her face went into her meal. Most recently today, she went for a walk and lost consciousness and awoke on the street. All incidents were preceded by lightheadedness and included head trauma. Patient has also been complaining of productive cough with white sputum x 3 weeks for which she has been followed by her PCP, pulm and ENT. She had CT scan of her sinuses that only revealed deviated septum and CT chest which revealed improvement in prior ?nodules. Patient states cough has been somewhat improving. Patient denies CP, palpitations, PND, orthopnea, N/V, hematochezia, melena, LE edema, tongue biting, evacuation of bladder/bowels, involuntary movement or seizure activity. She was scheduled for an outpatient stress test today and states she thinks she recently had an echocardiogram. Vitals on arrival to the ED were Temp 99.6F, HR 75bpm, /75, RR 18, O2 sat 96% RA. Labs significant for trops neg x 2. EKG revealed SR @ 70bpm with TWI in AVL, V2 and V3. CXR revealed small bandlike, patchy opacity across right lung base, likely representing atelectatic change. CT head revealed soft tissue injury of the left frontal scalp, no acute fracture, no intracranial hemorrhage. CTA ruled out PE and consolidation. In the ED, patient was given nebs, dTAP vaccine, solumedrol 125mg IV x 1, 1L NS. Patient is now admitted to [facility] for further workup of syncope. 69yo F, former smoker (quit 1mo ago), with FHx MI (brother MI 62yo, father 41yo) and PMHx of HTN, HLD, hypothyroidism, anxiety and COPD who presented to [facility] ED on 10/1/18 complaining of cough and syncope x 4. Patient states first episode was when she was getting an outpatient CT scan at [facility] last Wed 9/26. She was walking along and suddenly woke up on the floor. She declined to be brought to the ED there at that time. Her next episode occurred while sitting on the couch when she suddenly awoke on the floor. Patient states she does not think she was out for more than a few seconds because the same comercial was still on TV. Yesterday she synopsized at the dinner table and her face went into her meal. Most recently today, she went for a walk and lost consciousness and awoke on the street. All incidents were preceded by lightheadedness and included head trauma. Patient has also been complaining of productive cough with white sputum x 3 weeks for which she has been followed by her PCP, pulm and ENT. She had CT scan of her sinuses that only revealed deviated septum and CT chest which revealed improvement in prior ?nodules. Patient states cough has been somewhat improving. Patient denies CP, palpitations, PND, orthopnea, N/V, hematochezia, melena, LE edema, tongue biting, evacuation of bladder/bowels, involuntary movement or seizure activity. She was scheduled for an outpatient stress test today and states she thinks she recently had an echocardiogram. Vitals on arrival to the ED were Temp 99.6F, HR 75bpm, /75, RR 18, O2 sat 96% RA. Labs significant for trops neg x 2. EKG revealed SR @ 70bpm with TWI in AVL, V2 and V3. CXR revealed small bandlike, patchy opacity across right lung base, likely representing atelectatic change. CT head revealed soft tissue injury of the left frontal scalp, no acute fracture, no intracranial hemorrhage. CTA ruled out PE and consolidation. In the ED, patient was given nebs, dTAP vaccine, solumedrol 125mg IV x 1, 1L NS. Patient is now admitted to [facility] for further workup of syncope.  While on [facility], Trop negative x 3, Echo on 10/1/18: normal LV function, EF 60-65%, trace TR. NST on 10/2/18 was normal. US Carotids on 10/2 normal. EEG was normal and neuro felt syncope was caused by vasovagal reaction likely 2/2 increased recent stress and coughing spells. They recommend long term EEG monitoring at home as an outpt. Orthostatics was negative.   Sputum culture grew normal respiratory cory. Gram stain showed gram positive rods/cocci for which Dr. Becker recommended Zithromax. Pt was d/c on Z pack 5 day course and instructed to follow up with pulmonologist and PCP.   No significant events on telemetry overnight. Patient has been medically cleared for discharge as per Dr. Wiggins. Patient has been given appropriate discharge instructions including medication regimen and follow up. Medications that patient needs refills on have been e-prescribed to preferred pharmacy.     Temp 98.1 HR 77 /65 RR 16 SpO2 95% on 2L NC  Gen: NAD, A&O x3  Cards: RRR, clear S1 and S2 without murmur  Pulm: decreased breath sounds, miminal wheezing bilaterally, no rales or rhonci  Abd: soft, NT, BS +  Ext: no LE edema or ulcerations B/L

## 2018-10-08 DIAGNOSIS — Z87.891 PERSONAL HISTORY OF NICOTINE DEPENDENCE: ICD-10-CM

## 2018-10-08 DIAGNOSIS — H11.31 CONJUNCTIVAL HEMORRHAGE, RIGHT EYE: ICD-10-CM

## 2018-10-08 DIAGNOSIS — E78.5 HYPERLIPIDEMIA, UNSPECIFIED: ICD-10-CM

## 2018-10-08 DIAGNOSIS — I10 ESSENTIAL (PRIMARY) HYPERTENSION: ICD-10-CM

## 2018-10-08 DIAGNOSIS — R55 SYNCOPE AND COLLAPSE: ICD-10-CM

## 2018-10-08 DIAGNOSIS — E03.9 HYPOTHYROIDISM, UNSPECIFIED: ICD-10-CM

## 2018-10-08 DIAGNOSIS — J40 BRONCHITIS, NOT SPECIFIED AS ACUTE OR CHRONIC: ICD-10-CM

## 2018-10-08 DIAGNOSIS — J44.9 CHRONIC OBSTRUCTIVE PULMONARY DISEASE, UNSPECIFIED: ICD-10-CM

## 2018-10-08 DIAGNOSIS — F41.9 ANXIETY DISORDER, UNSPECIFIED: ICD-10-CM

## 2018-11-11 PROCEDURE — 82550 ASSAY OF CK (CPK): CPT

## 2018-11-11 PROCEDURE — 36415 COLL VENOUS BLD VENIPUNCTURE: CPT

## 2018-11-11 PROCEDURE — 99285 EMERGENCY DEPT VISIT HI MDM: CPT | Mod: 25

## 2018-11-11 PROCEDURE — 95951: CPT

## 2018-11-11 PROCEDURE — 87581 M.PNEUMON DNA AMP PROBE: CPT

## 2018-11-11 PROCEDURE — A9505: CPT

## 2018-11-11 PROCEDURE — 87486 CHLMYD PNEUM DNA AMP PROBE: CPT

## 2018-11-11 PROCEDURE — 80053 COMPREHEN METABOLIC PANEL: CPT

## 2018-11-11 PROCEDURE — 93017 CV STRESS TEST TRACING ONLY: CPT

## 2018-11-11 PROCEDURE — 93306 TTE W/DOPPLER COMPLETE: CPT

## 2018-11-11 PROCEDURE — 87070 CULTURE OTHR SPECIMN AEROBIC: CPT

## 2018-11-11 PROCEDURE — 80048 BASIC METABOLIC PNL TOTAL CA: CPT

## 2018-11-11 PROCEDURE — 83735 ASSAY OF MAGNESIUM: CPT

## 2018-11-11 PROCEDURE — 93880 EXTRACRANIAL BILAT STUDY: CPT

## 2018-11-11 PROCEDURE — 82553 CREATINE MB FRACTION: CPT

## 2018-11-11 PROCEDURE — 84484 ASSAY OF TROPONIN QUANT: CPT

## 2018-11-11 PROCEDURE — 90715 TDAP VACCINE 7 YRS/> IM: CPT

## 2018-11-11 PROCEDURE — 71045 X-RAY EXAM CHEST 1 VIEW: CPT

## 2018-11-11 PROCEDURE — 85027 COMPLETE CBC AUTOMATED: CPT

## 2018-11-11 PROCEDURE — 94640 AIRWAY INHALATION TREATMENT: CPT

## 2018-11-11 PROCEDURE — 97161 PT EVAL LOW COMPLEX 20 MIN: CPT

## 2018-11-11 PROCEDURE — 87633 RESP VIRUS 12-25 TARGETS: CPT

## 2018-11-11 PROCEDURE — 84443 ASSAY THYROID STIM HORMONE: CPT

## 2018-11-11 PROCEDURE — 84481 FREE ASSAY (FT-3): CPT

## 2018-11-11 PROCEDURE — 96361 HYDRATE IV INFUSION ADD-ON: CPT

## 2018-11-11 PROCEDURE — 90471 IMMUNIZATION ADMIN: CPT

## 2018-11-11 PROCEDURE — 85025 COMPLETE CBC W/AUTO DIFF WBC: CPT

## 2018-11-11 PROCEDURE — 70450 CT HEAD/BRAIN W/O DYE: CPT

## 2018-11-11 PROCEDURE — 70486 CT MAXILLOFACIAL W/O DYE: CPT

## 2018-11-11 PROCEDURE — 84439 ASSAY OF FREE THYROXINE: CPT

## 2018-11-11 PROCEDURE — 80061 LIPID PANEL: CPT

## 2018-11-11 PROCEDURE — 71275 CT ANGIOGRAPHY CHEST: CPT

## 2018-11-11 PROCEDURE — 83036 HEMOGLOBIN GLYCOSYLATED A1C: CPT

## 2018-11-11 PROCEDURE — 87798 DETECT AGENT NOS DNA AMP: CPT

## 2018-11-11 PROCEDURE — 78452 HT MUSCLE IMAGE SPECT MULT: CPT

## 2018-11-11 PROCEDURE — 96374 THER/PROPH/DIAG INJ IV PUSH: CPT | Mod: XU

## 2018-11-11 PROCEDURE — A9500: CPT

## 2022-08-29 NOTE — ED ADULT TRIAGE NOTE - MODE OF ARRIVAL
Last prescrbed on 6/28/22 90tabs 1 RF. Last seen 3/9/2022. Zonia sheppard, meds were in purse. Called patient to discuss, this was only med that was in the purse.     Called [facility] to see if they could give patient a refill they overrode and it went through her insurance. Patient will get medication today.    Jazmín Best RN       EMS

## 2022-10-06 NOTE — ED ADULT NURSE NOTE - NSFALLRSKINDICATORS_ED_ALL_ED
[FreeTextEntry1] : Follow-up \par \par \par Cough/s/p COVID/ s/p pneumonia \par cont PRN Albuterol inhaler \par Mucinex prn \par repeat  CXR \par \par 
no

## 2024-08-06 NOTE — PHYSICAL THERAPY INITIAL EVALUATION ADULT - ASSISTIVE DEVICE FOR TRANSFER: SIT/STAND, REHAB EVAL
Start Flexeril, be cautious with possible sedation, do not take and drive  Apply heat followed by 10 minutes of ice  Massage  If symptoms fail to improve follow up with PCP   no assistive device

## 2024-08-26 NOTE — PATIENT PROFILE ADULT. - TEACHING/LEARNING FACTORS IMPACT ABILITY TO LEARN
07/22/24 0500   Appointment Info   Signing clinician's name / credentials Minesh Landry, PT, DPT, OCS   Total/Authorized Visits 1   Medical Diagnosis Acute left sided low back pain without sciatica.   PT Tx Diagnosis Acute left sided low back pain without sciatica.   Quick Adds Certification   Progress Note/Certification   Start of Care Date 07/22/24   Onset of illness/injury or Date of Surgery 05/10/24   Therapy Frequency 1x/week   Predicted Duration 6 weeks   Certification date from 07/22/24   Certification date to 09/02/24   Progress Note Completed Date 07/22/24   PT Goal 1   Goal Identifier HEP   Goal Description Pt will be independent with HEP in order to improve LE strength and proprioception.   Target Date 09/02/24   Subjective Report   Subjective Report See eval.   Treatment Interventions (PT)   Interventions Therapeutic Procedure/Exercise   Therapeutic Procedure/Exercise   Therapeutic Procedures: strength, endurance, ROM, flexibility minutes (77970) 15   Ther Proc 1 - Details Discussed benefits of strengthening to address weakness in her knees that are contributing to falls.  Pt demonstrated understanding but pleasantly declined any attempts to address quad strengthening in supine, sitting, or standing due to concerns about pain.  PT educated that the risks of not performing exercises may be greater than the pain she has when performing them.  Pt reported understanding but again respectively declined.  PT provided HS curls in  and march standing which she performed 3 reps of each before declining to do any more due to pain in her knees. Instructed in semi tandem balance exercise which was appropriately challenging.  Discussed pool therapy as an option to unload her joints while strengthening her legs but patient declined due to fear of the water.   Skilled Intervention Exercise instruction as above.   Patient Response/Progress Pt reports understanding but is somewhat resistive to doing any strengthening  exercise due to pain in her knees.   Eval/Assessments   PT Eval, Low Complexity Minutes (74363) 20   Education   Learner/Method Patient   Education Comments HEP   Plan   Home program HS curl, march, semi-tandem.   Updates to plan of care 1x/week   Plan for next session Review HEP, encourage activity to improve strength and function.   Total Session Time   Timed Code Treatment Minutes 15   Total Treatment Time (sum of timed and untimed services) 35         DISCHARGE  Reason for Discharge: Patient has failed to schedule further appointments.    Equipment Issued: none.    Discharge Plan: Patient to continue home program.    Referring Provider:  Christopher Moreira     none